# Patient Record
Sex: FEMALE | Race: WHITE | NOT HISPANIC OR LATINO | Employment: FULL TIME | URBAN - METROPOLITAN AREA
[De-identification: names, ages, dates, MRNs, and addresses within clinical notes are randomized per-mention and may not be internally consistent; named-entity substitution may affect disease eponyms.]

---

## 2020-09-15 ENCOUNTER — OFFICE VISIT (OUTPATIENT)
Dept: OBSTETRICS AND GYNECOLOGY | Facility: CLINIC | Age: 25
End: 2020-09-15

## 2020-09-15 VITALS
SYSTOLIC BLOOD PRESSURE: 120 MMHG | BODY MASS INDEX: 28.89 KG/M2 | HEIGHT: 62 IN | WEIGHT: 157 LBS | DIASTOLIC BLOOD PRESSURE: 68 MMHG

## 2020-09-15 DIAGNOSIS — L68.0 FEMALE HIRSUTISM: ICD-10-CM

## 2020-09-15 DIAGNOSIS — N92.6 IRREGULAR MENSES: ICD-10-CM

## 2020-09-15 DIAGNOSIS — Z01.419 WOMEN'S ANNUAL ROUTINE GYNECOLOGICAL EXAMINATION: Primary | ICD-10-CM

## 2020-09-15 PROCEDURE — 99395 PREV VISIT EST AGE 18-39: CPT | Performed by: OBSTETRICS & GYNECOLOGY

## 2020-09-15 PROCEDURE — 99213 OFFICE O/P EST LOW 20 MIN: CPT | Performed by: OBSTETRICS & GYNECOLOGY

## 2020-09-15 RX ORDER — MULTIPLE VITAMINS W/ MINERALS TAB 9MG-400MCG
1 TAB ORAL DAILY
COMMUNITY

## 2020-09-15 NOTE — PROGRESS NOTES
GYN Annual Exam     CC - Here for annual exam.     Subjective   HPI  Mely Esquivel is a 25 y.o. female, , who presents for annual well woman exam.   Patient's last menstrual period was 09/10/2020 (exact date)..  Periods are irregular occurring every 60 days, lasting 4 days, medium flow, with blood clots.    Dysmenorrhea:mild, occurring premenstrually, first 1-2 days of flow and throughout menses.  Patient reports problems with: hair growth on chest, chin, sideburns - concerned about length between cycles and possible hormone imbalance..    Partner Status: Marital Status: .  New Partners since last visit: no.  Desires STD Screening: no.  HPV vaccinated? : yes    Additional OB/GYN History   Current contraception: contraceptive methods: Rhythm method  Desires to: continue contraception  Last Pap : 2018 results were negative, G/C negative  Last Completed Pap Smear       Status Date      PAP SMEAR No completions recorded        History of abnormal Pap smear: yes - 1 about 5-6 years ago but hasn't have any since then  Family history of uterine, colon, breast, or ovarian cancer: yes - father and MGF had colon cancer  Performs monthly Self-Breast Exam: no  Exercises Regularly:no  Feelings of Anxiety or Depression: yes - anxiety - untreated and no medication at this time  Tobacco Usage?: No   OB History        3    Para   1    Term           1    AB   2    Living   1       SAB        TAB   2    Ectopic        Molar        Multiple        Live Births   1                Health Maintenance   Topic Date Due   • Annual Gynecologic Pelvic and Breast Exam  1996   • ANNUAL PHYSICAL  1999   • HPV VACCINES (1 - 2-dose series) 2007   • TDAP/TD VACCINES (1 - Tdap) 2015   • INFLUENZA VACCINE  2020   • CHLAMYDIA SCREENING  2020   • PAP SMEAR  2020   •  AMB Pneumococcal Vaccine 65+ (1 of 1 - PPSV23) 2061   • HEPATITIS C SCREENING  Completed   •  AMB  "Pneumococcal Vaccine 0-64  Aged Out       The additional following portions of the patient's history were reviewed and updated as appropriate: allergies, current medications, past family history, past medical history, past social history, past surgical history and problem list.    Review of Systems   Constitutional: Negative.    HENT: Negative.    Eyes: Negative.    Respiratory: Negative.    Cardiovascular: Positive for palpitations.   Gastrointestinal: Negative.    Endocrine: Negative.    Genitourinary: Positive for menstrual problem.   Musculoskeletal: Negative.    Skin: Negative.    Allergic/Immunologic: Negative.    Neurological: Negative.    Psychiatric/Behavioral: The patient is nervous/anxious.        I have reviewed and agree with the HPI, ROS, and historical information as entered above. Kath Irizarry MD    Objective   /68   Ht 157.5 cm (62\")   Wt 71.2 kg (157 lb)   LMP 09/10/2020 (Exact Date)   Breastfeeding No   BMI 28.72 kg/m²     Physical Exam    General:  well developed; well nourished  no acute distress  Skin:  No suspicious lesions seen  Thyroid: normal to inspection and palpation  Breasts:  Examined in supine position  Symmetric without masses or skin dimpling  Nipples normal without inversion, lesions or discharge  There are no palpable axillary nodes  CVS: RRR, no M/R/G, distal pulses wnl  Resp: CTAB, No W/R/R  Abdomen: soft, non-tender; no masses  no umbilical or inguinal hernias are present  no hepato-splenomegaly  Pelvis: Clinical staff was present for exam  External genitalia:  normal appearance of the external genitalia including Bartholin's and [place]'s glands.  :  urethral meatus normal;  Vaginal:  normal pink mucosa without prolapse or lesions.  Cervix:  normal appearance.  Uterus:  normal size, shape and consistency.  Adnexa:  normal bimanual exam of the adnexa.    Assessment/Plan     Assessment     Problem List Items Addressed This Visit     Women's annual routine " gynecological examination - Primary    Relevant Orders    Pap IG, Rfx HPV ASCU    Female hirsutism    Irregular menses    Relevant Orders    DHEA-Sulfate    Estradiol    Follicle Stimulating Hormone    Luteinizing Hormone    Insulin, Random    CBC (No Diff)    17-Hydroxyprogesterone    TSH    Testosterone Free Direct    Lipid Panel          1. GYN annual well woman exam.   2. Irreg Menses  3. Hirsutism  4. H/o PTD 28wk    Plan     1. Reviewed Pap screening guidelines  2. Pap/ReflexHPV done/declines STD screening  3. Reviewed monthly self breast exams.  Instructed to call with lumps, pain, or breast discharge.    4. Reviewed exercise and healthy diet as a preventative health measures.   5. RTC in 1 year or PRN with problems  6. Other: Will f/u fasting for PCOS panel   7. Discussed 17OHP if she gets pregnant again.      Kath Irizarry MD  09/15/2020

## 2020-09-28 ENCOUNTER — LAB (OUTPATIENT)
Dept: LAB | Facility: HOSPITAL | Age: 25
End: 2020-09-28

## 2020-09-28 ENCOUNTER — TRANSCRIBE ORDERS (OUTPATIENT)
Dept: LAB | Facility: HOSPITAL | Age: 25
End: 2020-09-28

## 2020-09-28 DIAGNOSIS — N92.6 IRREGULAR MENSES: ICD-10-CM

## 2020-09-28 LAB
CHOLEST SERPL-MCNC: 167 MG/DL (ref 0–200)
DEPRECATED RDW RBC AUTO: 38.1 FL (ref 37–54)
ERYTHROCYTE [DISTWIDTH] IN BLOOD BY AUTOMATED COUNT: 12.6 % (ref 12.3–15.4)
ESTRADIOL SERPL HS-MCNC: 324 PG/ML
FSH SERPL-ACNC: 5.59 MIU/ML
HCT VFR BLD AUTO: 44.6 % (ref 34–46.6)
HDLC SERPL-MCNC: 65 MG/DL (ref 40–60)
HGB BLD-MCNC: 15.2 G/DL (ref 12–15.9)
LDLC SERPL CALC-MCNC: 91 MG/DL (ref 0–100)
LDLC/HDLC SERPL: 1.4 {RATIO}
LH SERPL-ACNC: 20.3 MIU/ML
MCH RBC QN AUTO: 28.3 PG (ref 26.6–33)
MCHC RBC AUTO-ENTMCNC: 34.1 G/DL (ref 31.5–35.7)
MCV RBC AUTO: 83.1 FL (ref 79–97)
PLATELET # BLD AUTO: 346 10*3/MM3 (ref 140–450)
PMV BLD AUTO: 10.6 FL (ref 6–12)
RBC # BLD AUTO: 5.37 10*6/MM3 (ref 3.77–5.28)
TRIGL SERPL-MCNC: 54 MG/DL (ref 0–150)
TSH SERPL DL<=0.05 MIU/L-ACNC: 1.86 UIU/ML (ref 0.27–4.2)
VLDLC SERPL-MCNC: 10.8 MG/DL (ref 5–40)
WBC # BLD AUTO: 9.8 10*3/MM3 (ref 3.4–10.8)

## 2020-09-28 PROCEDURE — 83001 ASSAY OF GONADOTROPIN (FSH): CPT

## 2020-09-28 PROCEDURE — 83002 ASSAY OF GONADOTROPIN (LH): CPT

## 2020-09-28 PROCEDURE — 83525 ASSAY OF INSULIN: CPT

## 2020-09-28 PROCEDURE — 84402 ASSAY OF FREE TESTOSTERONE: CPT

## 2020-09-28 PROCEDURE — 84443 ASSAY THYROID STIM HORMONE: CPT

## 2020-09-28 PROCEDURE — 85027 COMPLETE CBC AUTOMATED: CPT

## 2020-09-28 PROCEDURE — 82627 DEHYDROEPIANDROSTERONE: CPT

## 2020-09-28 PROCEDURE — 83498 ASY HYDROXYPROGESTERONE 17-D: CPT

## 2020-09-28 PROCEDURE — 36415 COLL VENOUS BLD VENIPUNCTURE: CPT

## 2020-09-28 PROCEDURE — 82670 ASSAY OF TOTAL ESTRADIOL: CPT

## 2020-09-28 PROCEDURE — 80061 LIPID PANEL: CPT

## 2020-09-30 LAB
DHEA-S SERPL-MCNC: 265 UG/DL (ref 110–431.7)
INSULIN SERPL-ACNC: 11.9 UIU/ML (ref 2.6–24.9)

## 2020-10-03 LAB — TESTOST FREE SERPL-MCNC: 2.8 PG/ML (ref 0–4.2)

## 2020-10-12 ENCOUNTER — LAB (OUTPATIENT)
Dept: LAB | Facility: HOSPITAL | Age: 25
End: 2020-10-12

## 2020-10-12 DIAGNOSIS — N92.6 IRREGULAR MENSTRUAL CYCLE: Primary | ICD-10-CM

## 2020-10-12 PROCEDURE — 36415 COLL VENOUS BLD VENIPUNCTURE: CPT

## 2020-10-12 PROCEDURE — 83498 ASY HYDROXYPROGESTERONE 17-D: CPT

## 2020-10-12 PROCEDURE — 83525 ASSAY OF INSULIN: CPT

## 2020-10-15 LAB — INSULIN SERPL-ACNC: 7.9 UIU/ML

## 2020-10-19 LAB — 17OHP SERPL-MCNC: 27 NG/DL

## 2021-11-02 ENCOUNTER — TELEPHONE (OUTPATIENT)
Dept: OBSTETRICS AND GYNECOLOGY | Facility: CLINIC | Age: 26
End: 2021-11-02

## 2023-05-19 ENCOUNTER — HOSPITAL ENCOUNTER (EMERGENCY)
Facility: HOSPITAL | Age: 28
Discharge: HOME | End: 2023-05-19
Attending: EMERGENCY MEDICINE
Payer: COMMERCIAL

## 2023-05-19 ENCOUNTER — APPOINTMENT (OUTPATIENT)
Dept: GENERAL RADIOLOGY | Facility: HOSPITAL | Age: 28
End: 2023-05-19
Payer: COMMERCIAL

## 2023-05-19 VITALS
TEMPERATURE: 98.8 F | WEIGHT: 165 LBS | HEIGHT: 61 IN | OXYGEN SATURATION: 100 % | BODY MASS INDEX: 31.15 KG/M2 | DIASTOLIC BLOOD PRESSURE: 80 MMHG | RESPIRATION RATE: 16 BRPM | HEART RATE: 85 BPM | SYSTOLIC BLOOD PRESSURE: 128 MMHG

## 2023-05-19 DIAGNOSIS — R20.2 ARM PARESTHESIA, LEFT: Primary | ICD-10-CM

## 2023-05-19 DIAGNOSIS — F41.9 ANXIETY: ICD-10-CM

## 2023-05-19 LAB
ALBUMIN SERPL-MCNC: 4.6 G/DL (ref 3.5–5.2)
ALBUMIN/GLOB SERPL: 1.4 G/DL
ALP SERPL-CCNC: 48 U/L (ref 39–117)
ALT SERPL W P-5'-P-CCNC: 21 U/L (ref 1–33)
ANION GAP SERPL CALCULATED.3IONS-SCNC: 14 MMOL/L (ref 5–15)
AST SERPL-CCNC: 22 U/L (ref 1–32)
BASOPHILS # BLD AUTO: 0.06 10*3/MM3 (ref 0–0.2)
BASOPHILS NFR BLD AUTO: 0.6 % (ref 0–1.5)
BILIRUB SERPL-MCNC: 0.6 MG/DL (ref 0–1.2)
BUN SERPL-MCNC: 8 MG/DL (ref 6–20)
BUN/CREAT SERPL: 10.7 (ref 7–25)
CALCIUM SPEC-SCNC: 9.7 MG/DL (ref 8.6–10.5)
CHLORIDE SERPL-SCNC: 105 MMOL/L (ref 98–107)
CO2 SERPL-SCNC: 22 MMOL/L (ref 22–29)
CREAT SERPL-MCNC: 0.75 MG/DL (ref 0.57–1)
D DIMER PPP FEU-MCNC: 0.28 MCGFEU/ML (ref 0–0.5)
DEPRECATED RDW RBC AUTO: 40.1 FL (ref 37–54)
EGFRCR SERPLBLD CKD-EPI 2021: 112.1 ML/MIN/1.73
EOSINOPHIL # BLD AUTO: 0 10*3/MM3 (ref 0–0.4)
EOSINOPHIL NFR BLD AUTO: 0 % (ref 0.3–6.2)
ERYTHROCYTE [DISTWIDTH] IN BLOOD BY AUTOMATED COUNT: 13 % (ref 12.3–15.4)
GLOBULIN UR ELPH-MCNC: 3.3 GM/DL
GLUCOSE SERPL-MCNC: 101 MG/DL (ref 65–99)
HCT VFR BLD AUTO: 46.9 % (ref 34–46.6)
HGB BLD-MCNC: 15.2 G/DL (ref 12–15.9)
IMM GRANULOCYTES # BLD AUTO: 0.04 10*3/MM3 (ref 0–0.05)
IMM GRANULOCYTES NFR BLD AUTO: 0.4 % (ref 0–0.5)
LYMPHOCYTES # BLD AUTO: 1.57 10*3/MM3 (ref 0.7–3.1)
LYMPHOCYTES NFR BLD AUTO: 16.6 % (ref 19.6–45.3)
MCH RBC QN AUTO: 27.4 PG (ref 26.6–33)
MCHC RBC AUTO-ENTMCNC: 32.4 G/DL (ref 31.5–35.7)
MCV RBC AUTO: 84.5 FL (ref 79–97)
MONOCYTES # BLD AUTO: 0.51 10*3/MM3 (ref 0.1–0.9)
MONOCYTES NFR BLD AUTO: 5.4 % (ref 5–12)
NEUTROPHILS NFR BLD AUTO: 7.29 10*3/MM3 (ref 1.7–7)
NEUTROPHILS NFR BLD AUTO: 77 % (ref 42.7–76)
NRBC BLD AUTO-RTO: 0 /100 WBC (ref 0–0.2)
PLATELET # BLD AUTO: 411 10*3/MM3 (ref 140–450)
PMV BLD AUTO: 9.4 FL (ref 6–12)
POTASSIUM SERPL-SCNC: 3.8 MMOL/L (ref 3.5–5.2)
PROT SERPL-MCNC: 7.9 G/DL (ref 6–8.5)
QT INTERVAL: 346 MS
QTC INTERVAL: 427 MS
RBC # BLD AUTO: 5.55 10*6/MM3 (ref 3.77–5.28)
SODIUM SERPL-SCNC: 141 MMOL/L (ref 136–145)
TROPONIN T SERPL HS-MCNC: <6 NG/L
WBC NRBC COR # BLD: 9.47 10*3/MM3 (ref 3.4–10.8)

## 2023-05-19 PROCEDURE — 93005 ELECTROCARDIOGRAM TRACING: CPT | Performed by: EMERGENCY MEDICINE

## 2023-05-19 PROCEDURE — 71045 X-RAY EXAM CHEST 1 VIEW: CPT

## 2023-05-19 PROCEDURE — 84484 ASSAY OF TROPONIN QUANT: CPT | Performed by: PHYSICIAN ASSISTANT

## 2023-05-19 PROCEDURE — 99283 EMERGENCY DEPT VISIT LOW MDM: CPT

## 2023-05-19 PROCEDURE — 85379 FIBRIN DEGRADATION QUANT: CPT | Performed by: PHYSICIAN ASSISTANT

## 2023-05-19 PROCEDURE — 80053 COMPREHEN METABOLIC PANEL: CPT | Performed by: PHYSICIAN ASSISTANT

## 2023-05-19 PROCEDURE — 85025 COMPLETE CBC W/AUTO DIFF WBC: CPT | Performed by: PHYSICIAN ASSISTANT

## 2023-05-19 RX ORDER — SODIUM CHLORIDE 0.9 % (FLUSH) 0.9 %
10 SYRINGE (ML) INJECTION AS NEEDED
Status: DISCONTINUED | OUTPATIENT
Start: 2023-05-19 | End: 2023-05-19 | Stop reason: HOSPADM

## 2023-05-19 NOTE — Clinical Note
[facility] EMERGENCY DEPARTMENT  [address] [facility] [address]  Phone: [phone]    Mely Esquivel was seen and treated in our emergency department on 5/19/2023.  She may return to work on 05/22/2023.         Thank you for choosing [facility].    Papito Archibald MD

## 2023-05-19 NOTE — DISCHARGE INSTRUCTIONS
Rest.  Tylenol or Motrin as directed for any discomfort.  Call to establish a PCP and arrange for follow-up.  Return if any acute concerns.

## 2023-05-19 NOTE — ED PROVIDER NOTES
"Subjective   History of Present Illness  Jumana Esquivel is a 28-year-old female with history of anxiety but no other known health issues.  She presents to the emergency department with complaints of a pulling sensation\" in the left arm that seems to be worse at night.  She has noticed it off and on for several days.  She states that she had a similar sensation in the right arm a few weeks ago and that it just resolved on its own.  She denies any pain in the arm.  There has been no swelling or redness.  She denies any neck pain.  No chest pain or shortness of breath.  No fever or chills.  No lower extremity pain, tingling or edema.  The patient states that she notices the tingling and then begins to become anxious about it and the tingling gets worse.  She forgets about it during the day and that seems to go away.  She is a non-smoker.  She drinks alcohol on occasion but none recently.  No drug use.        Review of Systems   Constitutional: Negative for chills and fever.   HENT: Negative for sore throat.    Respiratory: Negative for cough and shortness of breath.    Cardiovascular: Negative for chest pain and palpitations.   Gastrointestinal: Negative for abdominal pain, diarrhea, nausea and vomiting.   Genitourinary: Negative for dysuria.   Musculoskeletal: Negative for back pain and neck pain.   Skin: Negative for rash.   Neurological: Negative for weakness and numbness.        \"Tingling\" in left arm   Hematological: Negative.    Psychiatric/Behavioral:        History of anxiety and notes that when she notices the tingling her anxiety gets worse and then the tingling gets worse       Past Medical History:   Diagnosis Date   • Abnormal Pap smear of cervix    •     • Frequent UTI    • Miscarriage     2 or less   • Pregnancy     other than first       No Known Allergies    History reviewed. No pertinent surgical history.    Family History   Problem Relation Age of Onset   • Colon cancer Father    • Lung cancer " "Maternal Grandmother    • Colon cancer Maternal Grandfather        Social History     Socioeconomic History   • Marital status:    Tobacco Use   • Smoking status: Never   Substance and Sexual Activity   • Alcohol use: Yes     Comment: current some day   • Drug use: Never   • Sexual activity: Yes     Partners: Male     Birth control/protection: None           Objective   Physical Exam  Constitutional:       General: She is not in acute distress.     Appearance: Normal appearance. She is not ill-appearing or diaphoretic.   HENT:      Head: Normocephalic.      Nose: Nose normal.      Mouth/Throat:      Mouth: Mucous membranes are moist.   Eyes:      General: No scleral icterus.     Conjunctiva/sclera: Conjunctivae normal.      Pupils: Pupils are equal, round, and reactive to light.   Cardiovascular:      Rate and Rhythm: Normal rate and regular rhythm.      Pulses: Normal pulses.      Heart sounds: Normal heart sounds.      Comments: Normal radial pulses bilaterally.  Normal Rusty's test.  Good cap refill.  No swelling, increased warmth or tenderness.  Pulmonary:      Effort: Pulmonary effort is normal.      Breath sounds: Normal breath sounds.   Abdominal:      Tenderness: There is no abdominal tenderness.   Musculoskeletal:         General: No swelling or tenderness. Normal range of motion.      Cervical back: Normal range of motion and neck supple. No tenderness.      Right lower leg: No edema.      Left lower leg: No edema.   Skin:     General: Skin is warm and dry.      Coloration: Skin is not jaundiced or pale.      Findings: No bruising or rash.   Neurological:      General: No focal deficit present.      Mental Status: She is alert and oriented to person, place, and time.   Psychiatric:      Comments: Somewhat anxious appearing         Procedures           ED Course      In summary, healthy 28-year-old female with a history of anxiety, presents to the emergency department with mild \"tingling\" in the left " arm.  The patient states that this has been intermittent for about 3 days and seems to be more noticeable at night.  She states that she becomes anxious about it and that tends to make the tingling worse.  The tingling either resolves during the day or she does not notice it during her daily activities.  No arm pain or swelling.    MDM: Differential includes anxiety, paresthesia, electrolyte abnormality, radicular neuropathy, hyper or hypoglycemia, etc.    Labs obtained.  Chest x-ray and EKG also ordered.  Patient appears in no distress.    Labs are bland with a normal glucose of 101.  Normal renal and hepatic functions.  Normal chemistries.  White count is normal at 9.47.  No anemia.  D-dimer is negative making likelihood of DVT very low.  High-sensitivity troponin is negative.    Chest x-ray shows no active disease.    EKG shows sinus rhythm with a rate of 92 with nonspecific T abnormalities.  No old EKG for comparison.    11:11 EDT  I spoke with the patient about her work-up.  I feel that anxiety is a major component today.  I reassured her that her labs, EKG, D-dimer, chest x-ray were normal.  She states that she already feels better and will follow-up or return if symptoms persist or worsen.                                         MDM    Final diagnoses:   Arm paresthesia, left   Anxiety       ED Disposition  ED Disposition     ED Disposition   Discharge    Condition   Stable    Comment   --             PATIENT CONNECTION - [place] [address]  [phone]    Call to establish PCP and arrange for follow-up as needed.    [facility] Emergency Department  [address] [address] [phone]    If symptoms worsen         Medication List      No changes were made to your prescriptions during this visit.          Andrés Morgan, PA  05/19/23 1111

## 2023-05-24 ENCOUNTER — OFFICE VISIT (OUTPATIENT)
Dept: FAMILY MEDICINE CLINIC | Facility: CLINIC | Age: 28
End: 2023-05-24
Payer: COMMERCIAL

## 2023-05-24 DIAGNOSIS — R20.2 ARM PARESTHESIA, LEFT: ICD-10-CM

## 2023-05-24 DIAGNOSIS — F41.9 ANXIETY: Primary | ICD-10-CM

## 2023-05-24 PROBLEM — F50.2 BULIMIA: Status: ACTIVE | Noted: 2023-05-24

## 2023-05-24 PROBLEM — F50.20 BULIMIA: Status: ACTIVE | Noted: 2023-05-24

## 2023-05-24 PROCEDURE — 99204 OFFICE O/P NEW MOD 45 MIN: CPT

## 2023-05-24 RX ORDER — HYDROXYZINE HYDROCHLORIDE 25 MG/1
25 TABLET, FILM COATED ORAL 3 TIMES DAILY PRN
Qty: 30 TABLET | Refills: 1 | Status: SHIPPED | OUTPATIENT
Start: 2023-05-24

## 2023-05-24 NOTE — PROGRESS NOTES
"    Office Note     Name: Mely Esquivel    : 1995     MRN: [id number]     Chief Complaint  Establish care, arm pain    History of Present Illness:  Mely Esquivel is a 28 y.o. female who presents today for establishment of care and arm pain.  She denies having a regular PCP who she has seen in the past.  She does report a past medical history of anxiety.  She does not take any daily medicines at present time.    She does report that she believes much of her anxiety is health related.  She reports she often worries about her health frequently and the health of her son.  She reports that it causes her a great deal of stress to be away from home and away from nearby hospitals and medical care. She denies history of panic attacks, but does believe that sometimes her anxiety heightens and she has \"borderline panic attacks.\"  She denies depression symptoms.  She denies SI/HI.  She has never engaged in counseling, however she does believe this is a service that her work offers and is interested in starting counseling.  She does not wish to start medication for anxiety daily.  She does have many concerns regarding the medicines.  Also of note, she reports that she has struggled with bulimia for the past 13 years.  However, she feels like her bulimia is well controlled at present time.  She denies any purging behaviors for nearly a year.    Also of note, she reports she has been experiencing tingling and burning arm pain intermittently for the past 2 weeks.  Reports that it originally began as tingling in her lower right arm and \"feeling like my arm is falling asleep.\"  She reports that the symptoms usually occurred at night.  She reports that only happened once in the right arm and then went away.  She reports that the next week she began feeling similar sensations in her lower left arm and hand.  Reports that when these episodes occur, it worsens her anxiety and she is fearful of what the cause of the symptoms " is. Of note, she do go to ED on 2023 for left arm tingling. All work up, including EKG and labs were within normal range.  She does report that she fell on her right arm 3 weeks ago, but denies any other injury.  She reports that right after the fall she was able to use her arm immediately and does not believe that she injured herself in the fall.  She denies any weakness in either extremity.  She denies that the pain is brought on by musculoskeletal activation, certain range of motion, etc.  Also of note, she does work at a computer for 8 hours/day.  She denies any history of neck trauma, significant neck pain.    Subjective     Review of Systems:   Review of Systems   Constitutional: Negative for chills and fever.   Respiratory: Negative for shortness of breath.    Cardiovascular: Negative for chest pain and palpitations.   Musculoskeletal: Negative for arthralgias, back pain, joint swelling, myalgias, neck pain and neck stiffness.        Arm pain   Skin: Negative for color change.   Neurological: Negative for weakness.   Psychiatric/Behavioral: Negative for depressed mood. The patient is nervous/anxious.        I have reviewed the patients family history, social history, past medical history, past surgical history and have updated it as appropriate.     Past Medical History:   Past Medical History:   Diagnosis Date   • Abnormal Pap smear of cervix    •     • Frequent UTI    • Miscarriage     2 or less   • Pregnancy     other than first       Past Surgical History:   Past Surgical History:   Procedure Laterality Date   • DILATATION AND CURETTAGE     • DILATION AND CURETTAGE, DIAGNOSTIC / THERAPEUTIC         Family History:   Family History   Problem Relation Age of Onset   • Hypertension Mother    • Depression Mother    • Colon cancer Father    • Lung cancer Maternal Grandmother    • Colon cancer Maternal Grandfather        Social History:   Social History     Socioeconomic History   • Marital  "status:    Tobacco Use   • Smoking status: Never   • Smokeless tobacco: Never   Vaping Use   • Vaping Use: Never used   Substance and Sexual Activity   • Alcohol use: Yes     Comment: once per month   • Drug use: Never   • Sexual activity: Yes     Partners: Male     Birth control/protection: None       Immunizations:   Immunization History   Administered Date(s) Administered   • Tdap 03/01/2016        Medications:     Current Outpatient Medications:   •  hydrOXYzine (ATARAX) 25 MG tablet, Take 1 tablet by mouth 3 (Three) Times a Day As Needed for Anxiety., Disp: 30 tablet, Rfl: 1    Allergies:   No Known Allergies    Objective     Vital Signs  Vitals:    05/24/23 1134   BP: 126/74   BP Location: Right arm   Patient Position: Sitting   Cuff Size: Adult   Pulse: 78   Resp: 15   Temp: 97 °F (36.1 °C)   SpO2: 99%   Weight: 74.4 kg (164 lb)   Height: 154.9 cm (61\")   PainSc:   2   PainLoc: Arm     Estimated body mass index is 30.99 kg/m² as calculated from the following:    Height as of this encounter: 154.9 cm (61\").    Weight as of this encounter: 74.4 kg (164 lb).          Physical Exam  Vitals and nursing note reviewed.   Constitutional:       General: She is not in acute distress.     Appearance: Normal appearance. She is not ill-appearing, toxic-appearing or diaphoretic.   HENT:      Head: Normocephalic and atraumatic.   Eyes:      Extraocular Movements: Extraocular movements intact.   Cardiovascular:      Rate and Rhythm: Normal rate and regular rhythm.      Heart sounds: No murmur heard.    No friction rub. No gallop.   Pulmonary:      Effort: Pulmonary effort is normal. No respiratory distress.      Breath sounds: No wheezing, rhonchi or rales.   Musculoskeletal:      Right shoulder: No tenderness. Normal range of motion. Normal strength.      Left shoulder: No tenderness. Normal range of motion. Normal strength.      Right elbow: Normal range of motion. No tenderness.      Left elbow: Normal range of " motion. No tenderness.      Right forearm: No swelling, tenderness or bony tenderness.      Left forearm: No swelling, tenderness or bony tenderness.      Right wrist: No swelling or tenderness. Normal range of motion. Normal pulse.      Left wrist: No swelling or tenderness. Normal range of motion. Normal pulse.      Left hand: Normal capillary refill. Normal pulse.      Cervical back: Normal range of motion.      Comments: Negative Spurling's test to the left.  Negative Tinel and Phalen's test   Skin:     Coloration: Skin is not pale.   Neurological:      Mental Status: She is alert and oriented to person, place, and time. Mental status is at baseline.   Psychiatric:         Mood and Affect: Mood normal.         Thought Content: Thought content normal.          Assessment and Plan     1. Anxiety  -JASON 7 Total Score: 21  -PHQ-9 Total Score: 11  -Denies SI/HI.  -We did discuss the mechanism of action of SSRI medications.  We discussed possible side effects and blackbox warnings associated with SSRIs.  -At this time, she does report that she is nervous to start a daily maintenance medication and does not wish to move forward with prescription for SSRI.  -We also discussed the use of hydroxyzine to have as needed for heightened anxiety.  We discussed the mechanism of action of this medication and possible side effects.  We did discuss that this medication can make her drowsy so she should not take prior to driving, operating machinery, etc.  -If she has any intolerance to the medication, I encouraged her to call or return to care.  -We also discussed that I believe counseling would be very beneficial for this patient.  I offered to give her local counseling information.  She reports she is going to inquire about counseling through her work.  -If symptoms worsen or new symptoms develop prior to follow-up appointment, she has been encouraged to return to care.  -The patient verbalized understanding and had no further  questions.  - hydrOXYzine (ATARAX) 25 MG tablet; Take 1 tablet by mouth 3 (Three) Times a Day As Needed for Anxiety.  Dispense: 30 tablet; Refill: 1    2. Arm paresthesia, left  -Although I am not able to reproduce the patient's symptoms with physical exam maneuvers, I am suspicious that her symptoms are consistent with carpal tunnel.  -We did discuss sleeping in wrist splints at night to help take compression off the median nerve.  -I also encouraged her to take an ibuprofen in the morning and night daily to help relieve inflammation of the nerve.  -We did discuss there are other options such as physical therapy for treatment.  We will start with most conservative approach including over-the-counter medications and splinting before proceeding with referral or imaging.  -If symptoms worsen or new symptoms develop, she has been encouraged to return to care.  -The patient verbalized understanding and had no further questions.         Follow Up  Return in about 1 month (around 6/24/2023) for Annual physical.    Nessa Lawrence PA-C  [facility] [place] [place]

## 2023-05-25 VITALS
OXYGEN SATURATION: 99 % | BODY MASS INDEX: 30.96 KG/M2 | WEIGHT: 164 LBS | RESPIRATION RATE: 15 BRPM | HEART RATE: 78 BPM | SYSTOLIC BLOOD PRESSURE: 126 MMHG | DIASTOLIC BLOOD PRESSURE: 74 MMHG | TEMPERATURE: 97 F | HEIGHT: 61 IN

## 2023-06-06 NOTE — PROGRESS NOTES
"     Female Physical Note      Date: 2023   Patient Name: Mely Esquivel  : 1995   MRN: [id number]     Chief Complaint:    Chief Complaint   Patient presents with    Annual Exam       History of Present Illness: Mely Esquivel is a 28 y.o. female who is here today for their annual health maintenance and physical.     At her last appointment on 2023, she was found to have elevated PHQ-9 and JASON-7 scores.  We did discuss SSRIs at length, however she would not like to start any SSRIs at present time.  She was prescribed hydroxyzine to have as needed for panic attacks.  She has not taken any hydroxyzine since she was last here.  She reports that her anxiety and depression has not worsened since last appointment.  Denies SI/HI.  She is hoping to start counseling with counseling services that her work offers soon.    She has continued to have intermittent numbness/tingling in her lower arms, from the elbow down.  She does report that she is experiencing this less frequently than she was 2 weeks ago.  She reports this occurs mainly at night.  She reports it is occurring more frequently in her right arm than in her left arm.  She reports that \"feels like my arm is going to sleep.\"  She denies ever having the symptoms during the day.  She denies any weakness in the arms.  She denies any discoloration of the hands ever.  She denies any history of trauma or injury.  She has not yet purchased any wrist splints or braces.  She does report sometimes her numbness will extend into her right pinky finger and ring finger.  She has not taken any over-the-counter medication.  She denies any history of neck trauma or neck pain.  She has no pain with neck movement.  Neck movement does not elicit shooting pains down the arms.  There is no swelling in either arm.      Subjective      Review of Systems:   Review of Systems   Constitutional:  Negative for appetite change, chills, fatigue, fever, unexpected weight gain " and unexpected weight loss.   HENT:  Negative for congestion, ear pain, trouble swallowing and voice change.    Eyes:  Negative for blurred vision, double vision and visual disturbance.   Respiratory:  Negative for cough, chest tightness, shortness of breath and wheezing.    Cardiovascular:  Negative for chest pain, palpitations and leg swelling.   Gastrointestinal:  Negative for abdominal pain, blood in stool, constipation, diarrhea, nausea, vomiting and GERD.   Endocrine: Negative for cold intolerance and heat intolerance.   Genitourinary:  Negative for breast discharge, breast lump, breast pain, dysuria, hematuria, pelvic pain, vaginal bleeding, vaginal discharge and vaginal pain.   Musculoskeletal:  Negative for back pain and neck pain.   Skin:  Negative for rash.   Neurological:  Positive for numbness. Negative for dizziness, tremors, seizures, syncope, light-headedness, headache and confusion.   Hematological:  Does not bruise/bleed easily.   Psychiatric/Behavioral:  Negative for sleep disturbance, suicidal ideas and depressed mood. The patient is nervous/anxious.      Past Medical History, Social History, Family History and Care Team were all reviewed with patient and updated as appropriate.     Medications:     Current Outpatient Medications:     hydrOXYzine (ATARAX) 25 MG tablet, Take 1 tablet by mouth 3 (Three) Times a Day As Needed for Anxiety., Disp: 30 tablet, Rfl: 1    Allergies:   No Known Allergies    Immunizations:  Health Maintenance Summary            Postponed - COVID-19 Vaccine (1) Postponed until 6/9/2023 06/07/2023  Postponed until 6/9/2023 by Adriana Lawrence PA-C (Patient Refused)              INFLUENZA VACCINE (Yearly - August to March) Next due on 8/1/2023      No completion, postpone, or frequency change history exists for this topic.              PAP SMEAR (Every 3 Years) Next due on 9/15/2023      09/15/2020  Pap IG, Rfx HPV ASCU              ANNUAL PHYSICAL (Yearly) Next due  on 6/7/2024 06/07/2023  Done              TDAP/TD VACCINES (2 - Td or Tdap) Next due on 3/1/2026      03/01/2016  Imm Admin: Tdap              HEPATITIS C SCREENING  Completed      04/22/2016  Hep C Virus Ab component of Hepatitis panel, acute              Pneumococcal Vaccine 0-64 (Series Information) Aged Out      No completion, postpone, or frequency change history exists for this topic.                     No orders of the defined types were placed in this encounter.       Colorectal Screening:   N/A  Last Completed Colonoscopy       This patient has no relevant Health Maintenance data.          Pap:  Last pap was 09/15/2020, Gynecologist is Samantha Christiansen  Last Completed Pap Smear            PAP SMEAR (Every 3 Years) Next due on 9/15/2023      09/15/2020  Pap IG, Rfx HPV ASCU                   Mammogram:  N/A  Last Completed Mammogram       This patient has no relevant Health Maintenance data.             CT for Smoker (Age 50-80, 20 pk yr):   N/A   Bone Density/DEXA (Age 65 or high risk): N/A  Hep C (Age 18-79 once): Ordered today  HIV (Age 15-65 once): Not ordered today  A1c: Ordered today  Lipid panel: Ordered today    The ASCVD Risk score (Alicia DK, et al., 2019) failed to calculate for the following reasons:    The 2019 ASCVD risk score is only valid for ages 40 to 79    Dermatology: Not within the year, no concerns   Optometrist: Not within the year, no concerns   Dentist: Has appointment in June, no concerns     Tobacco Use: Low Risk     Smoking Tobacco Use: Never    Smokeless Tobacco Use: Never    Passive Exposure: Never       Social History     Substance and Sexual Activity   Alcohol Use Yes    Comment: once per month        Social History     Substance and Sexual Activity   Drug Use Never        Diet/Physical activity: Diet: well-balanced, a good variety of vegetables, drinks mainly water, Physical activity: Tries to walk on lunch break 30 minutes per day  Sleep: At least 8 hours  "nightly    Sexual Health: No current contraception, not attempting pregnancy but fine with possibility, not currently on prenatal but did discuss it  Menopause: N/A  Menstrual Cycles: Menstrual cycles occurring regularly but usually 45 day cycle, last menstrual cycle was May 14th  Domestic Violence: Denies    Depression: PHQ-2 Depression Screening  PHQ-9 Total Score: 11    Measures:   Advanced Care Planning:   Patient does not have an advance directive, information provided.    Smoking Cessation:   N/A    Objective     Physical Exam:  Vital Signs:   Vitals:    06/07/23 1104   BP: 126/72   BP Location: Left arm   Patient Position: Sitting   Cuff Size: Adult   Pulse: 74   Resp: 16   Temp: 97 °F (36.1 °C)   SpO2: 99%   Weight: 75.8 kg (167 lb)   Height: 154.9 cm (61\")     Body mass index is 31.55 kg/m².     Physical Exam  Vitals and nursing note reviewed.   Constitutional:       General: She is not in acute distress.     Appearance: Normal appearance. She is not ill-appearing, toxic-appearing or diaphoretic.   HENT:      Head: Normocephalic and atraumatic.   Eyes:      Extraocular Movements: Extraocular movements intact.   Neck:      Comments: No thyromegaly or masses appreciated.  Cervical extension and rotation does not reproduce pain or tingling in arms  Cardiovascular:      Rate and Rhythm: Normal rate and regular rhythm.      Heart sounds: No murmur heard.    No friction rub. No gallop.   Pulmonary:      Effort: Pulmonary effort is normal. No respiratory distress.      Breath sounds: No wheezing, rhonchi or rales.   Musculoskeletal:      Right elbow: No swelling. Normal range of motion.      Left elbow: No swelling. Normal range of motion.      Right forearm: No swelling.      Left forearm: No swelling.      Right wrist: No swelling, tenderness or bony tenderness. Normal range of motion. Normal pulse.      Left wrist: No swelling, tenderness or bony tenderness. Normal range of motion. Normal pulse.      Right " hand: No swelling, tenderness or bony tenderness. Normal range of motion. Normal strength. Normal sensation. Normal capillary refill.      Left hand: No swelling, tenderness or bony tenderness. Normal range of motion. Normal strength. Normal sensation. Normal capillary refill.      Cervical back: Normal range of motion. No rigidity.      Comments: Tapping on cubital tunnel does not precipitate symptoms on right arm   Lymphadenopathy:      Cervical: No cervical adenopathy.   Skin:     Coloration: Skin is not pale.   Neurological:      Mental Status: She is alert and oriented to person, place, and time. Mental status is at baseline.   Psychiatric:         Mood and Affect: Mood normal.         Thought Content: Thought content normal.       Assessment / Plan      Assessment/Plan:   Diagnoses and all orders for this visit:    1. Well adult exam (Primary)  -Yearly health maintenance labs ordered today.  -We did discuss preventative health topics such as importance of yearly optometry appointments, yearly skin checks, and twice yearly dental appointments and adhering to a well-balanced diet, regular physical activity, regular sunscreen use, seatbelt use, monthly self breast exams, mental health, etc.  -Did discuss importance of prenatal vitamin, as she is currently sexually active and is not on any form of birth control.  She does not wish to discuss any type of contraception at present time.  -She is up-to-date on Pap smear.  -     CBC (No Diff); Future  -     Comprehensive Metabolic Panel; Future  -     Hemoglobin A1c; Future  -     Lipid Panel; Future  -     Vitamin B12; Future  -     TSH Rfx On Abnormal To Free T4; Future  -     Urinalysis With Culture If Indicated -; Future  -     Iron Profile; Future  -     Ferritin; Future  -     CBC (No Diff)  -     Comprehensive Metabolic Panel  -     Hemoglobin A1c  -     Lipid Panel  -     Vitamin B12  -     TSH Rfx On Abnormal To Free T4  -     Urinalysis With Culture If  Indicated - Urine, Clean Catch  -     Iron Profile  -     Ferritin    2. Anxiety  -PHQ-9 Total Score: 11  -PHQ-9 and JASON-7 scores are elevated.  -We had a lengthy discussion at last appointment about pathophysiology and mechanism of action of SSRIs to treat depression and anxiety.  We revisited this topic today, and she would not like to move forward with any pharmacologic intervention at present time.  -I have encouraged enrolling in counseling, which is a service her work offers.  -She has hydroxyzine to take as needed for panic attacks.  -If symptoms of anxiety or depression worsen or new symptoms develop, she has been encouraged to return to care.    3. Paresthesia of arm  -It appears that her paresthesias are likely due to either carpal tunnel or cubital tunnel syndrome.  -I have discussed supportive care options for now, ways to relieve compression of the nerve, taking ibuprofen to help with inflammation, sleeping in carpal tunnel splints or using elbow soft brace.  -We did discuss that if symptoms persist or worsen, other options could include physical therapy and nerve conduction/EMG studies.  -Patient reports that her symptoms do seem to be improving.  If symptoms worsen or new symptoms develop, she reports she will return to care sooner than follow-up appointment.    4. Obesity (BMI 30.0-34.9)  -We discussed that the foundation of weight loss should be diet and exercise.  We discussed the importance of a well-balanced diet.  We discussed sustainable dietary changes, such as decreasing portion size, cutting out high calorie drinks, abstaining from eating late at night when metabolism is at its slowest, and trying to reduce fatty and fried food intake.  I also recommended daily exercise, ideally 150 minutes of aerobic activity weekly.  -We discussed that obesity can predispose her to chronic conditions such as hypertension, GERD, type 2 diabetes, sleep apnea etc.       Healthcare Maintenance:  Counseling  provided based on age appropriate USPSTF guidelines.  BMI is >= 30 and <35. (Class 1 Obesity). The following options were offered after discussion;: exercise counseling/recommendations and nutrition counseling/recommendations    Mely Esquivel voices understanding and acceptance of this advice and will call back with any further questions or concerns. AVS with preventive healthcare tips printed for patient.     Follow Up:   Return in about 6 months (around 12/7/2023) for Recheck.      At [facility], we believe that sharing information builds trust and better relationships. You are receiving this note because you recently visited [facility]. It is possible you will see health information before a provider has talked with you about it. This kind of information can be easy to misunderstand. To help you fully understand what it means for your health, we urge you to discuss this note with your provider.    Nessa Lawrence PA-C  [facility]

## 2023-06-07 ENCOUNTER — OFFICE VISIT (OUTPATIENT)
Dept: FAMILY MEDICINE CLINIC | Facility: CLINIC | Age: 28
End: 2023-06-07
Payer: COMMERCIAL

## 2023-06-07 VITALS
HEART RATE: 74 BPM | SYSTOLIC BLOOD PRESSURE: 126 MMHG | OXYGEN SATURATION: 99 % | TEMPERATURE: 97 F | BODY MASS INDEX: 31.53 KG/M2 | WEIGHT: 167 LBS | DIASTOLIC BLOOD PRESSURE: 72 MMHG | HEIGHT: 61 IN | RESPIRATION RATE: 16 BRPM

## 2023-06-07 DIAGNOSIS — Z00.00 WELL ADULT EXAM: Primary | ICD-10-CM

## 2023-06-07 DIAGNOSIS — E66.9 OBESITY (BMI 30.0-34.9): ICD-10-CM

## 2023-06-07 DIAGNOSIS — F41.9 ANXIETY: ICD-10-CM

## 2023-06-07 DIAGNOSIS — R20.2 PARESTHESIA OF ARM: ICD-10-CM

## 2023-06-07 LAB
BILIRUB UR QL STRIP: NEGATIVE
CLARITY UR: CLEAR
COLOR UR: YELLOW
DEPRECATED RDW RBC AUTO: 38.9 FL (ref 37–54)
ERYTHROCYTE [DISTWIDTH] IN BLOOD BY AUTOMATED COUNT: 12.8 % (ref 12.3–15.4)
GLUCOSE UR STRIP-MCNC: NEGATIVE MG/DL
HCT VFR BLD AUTO: 44.8 % (ref 34–46.6)
HGB BLD-MCNC: 14.9 G/DL (ref 12–15.9)
HGB UR QL STRIP.AUTO: NEGATIVE
KETONES UR QL STRIP: NEGATIVE
LEUKOCYTE ESTERASE UR QL STRIP.AUTO: NEGATIVE
MCH RBC QN AUTO: 27.7 PG (ref 26.6–33)
MCHC RBC AUTO-ENTMCNC: 33.3 G/DL (ref 31.5–35.7)
MCV RBC AUTO: 83.4 FL (ref 79–97)
NITRITE UR QL STRIP: NEGATIVE
PH UR STRIP.AUTO: 6 [PH] (ref 5–8)
PLATELET # BLD AUTO: 351 10*3/MM3 (ref 140–450)
PMV BLD AUTO: 10.3 FL (ref 6–12)
PROT UR QL STRIP: NEGATIVE
RBC # BLD AUTO: 5.37 10*6/MM3 (ref 3.77–5.28)
SP GR UR STRIP: 1.01 (ref 1–1.03)
UROBILINOGEN UR QL STRIP: NORMAL
WBC NRBC COR # BLD: 8.28 10*3/MM3 (ref 3.4–10.8)

## 2023-06-07 PROCEDURE — 80061 LIPID PANEL: CPT

## 2023-06-07 PROCEDURE — 83036 HEMOGLOBIN GLYCOSYLATED A1C: CPT

## 2023-06-07 PROCEDURE — 80050 GENERAL HEALTH PANEL: CPT

## 2023-06-07 PROCEDURE — 82607 VITAMIN B-12: CPT

## 2023-06-07 PROCEDURE — 83540 ASSAY OF IRON: CPT

## 2023-06-07 PROCEDURE — 81003 URINALYSIS AUTO W/O SCOPE: CPT

## 2023-06-07 PROCEDURE — 82728 ASSAY OF FERRITIN: CPT

## 2023-06-07 PROCEDURE — 84466 ASSAY OF TRANSFERRIN: CPT

## 2023-06-08 LAB
ALBUMIN SERPL-MCNC: 5 G/DL (ref 3.5–5.2)
ALBUMIN/GLOB SERPL: 1.9 G/DL
ALP SERPL-CCNC: 41 U/L (ref 39–117)
ALT SERPL W P-5'-P-CCNC: 32 U/L (ref 1–33)
ANION GAP SERPL CALCULATED.3IONS-SCNC: 12 MMOL/L (ref 5–15)
AST SERPL-CCNC: 25 U/L (ref 1–32)
BILIRUB SERPL-MCNC: 0.3 MG/DL (ref 0–1.2)
BUN SERPL-MCNC: 7 MG/DL (ref 6–20)
BUN/CREAT SERPL: 9.5 (ref 7–25)
CALCIUM SPEC-SCNC: 9.4 MG/DL (ref 8.6–10.5)
CHLORIDE SERPL-SCNC: 102 MMOL/L (ref 98–107)
CHOLEST SERPL-MCNC: 153 MG/DL (ref 0–200)
CO2 SERPL-SCNC: 26 MMOL/L (ref 22–29)
CREAT SERPL-MCNC: 0.74 MG/DL (ref 0.57–1)
EGFRCR SERPLBLD CKD-EPI 2021: 113.9 ML/MIN/1.73
FERRITIN SERPL-MCNC: 110 NG/ML (ref 13–150)
GLOBULIN UR ELPH-MCNC: 2.6 GM/DL
GLUCOSE SERPL-MCNC: 81 MG/DL (ref 65–99)
HBA1C MFR BLD: 5.4 % (ref 4.8–5.6)
HDLC SERPL-MCNC: 52 MG/DL (ref 40–60)
IRON 24H UR-MRATE: 78 MCG/DL (ref 37–145)
IRON SATN MFR SERPL: 18 % (ref 20–50)
LDLC SERPL CALC-MCNC: 91 MG/DL (ref 0–100)
LDLC/HDLC SERPL: 1.75 {RATIO}
POTASSIUM SERPL-SCNC: 4 MMOL/L (ref 3.5–5.2)
PROT SERPL-MCNC: 7.6 G/DL (ref 6–8.5)
SODIUM SERPL-SCNC: 140 MMOL/L (ref 136–145)
TIBC SERPL-MCNC: 446 MCG/DL (ref 298–536)
TRANSFERRIN SERPL-MCNC: 299 MG/DL (ref 200–360)
TRIGL SERPL-MCNC: 49 MG/DL (ref 0–150)
TSH SERPL DL<=0.05 MIU/L-ACNC: 1.05 UIU/ML (ref 0.27–4.2)
VIT B12 BLD-MCNC: 511 PG/ML (ref 211–946)
VLDLC SERPL-MCNC: 10 MG/DL (ref 5–40)

## 2023-11-22 ENCOUNTER — TELEPHONE (OUTPATIENT)
Dept: FAMILY MEDICINE CLINIC | Facility: CLINIC | Age: 28
End: 2023-11-22
Payer: COMMERCIAL

## 2023-11-22 DIAGNOSIS — R30.0 DYSURIA: Primary | ICD-10-CM

## 2023-11-22 NOTE — TELEPHONE ENCOUNTER
Caller: Mely Esquivel    Relationship: Self    Best call back number: [phone]     What medication are you requesting: SOMETHING TO TREAT A UTI    What are your current symptoms: URGENCY TO URINATE     How long have you been experiencing symptoms: THIS MORNING     Have you had these symptoms before:    [x] Yes  [] No    Have you been treated for these symptoms before:   [x] Yes  [] No    If a prescription is needed, what is your preferred pharmacy and phone number:  [id number] - [place], [place] - [address] [place] [address] - [phone]  - [phone]     Additional notes:

## 2023-11-27 RX ORDER — NITROFURANTOIN 25; 75 MG/1; MG/1
100 CAPSULE ORAL 2 TIMES DAILY
Qty: 20 CAPSULE | Refills: 0 | Status: SHIPPED | OUTPATIENT
Start: 2023-11-27

## 2024-04-16 ENCOUNTER — OFFICE VISIT (OUTPATIENT)
Dept: OBSTETRICS AND GYNECOLOGY | Facility: CLINIC | Age: 29
End: 2024-04-16
Payer: COMMERCIAL

## 2024-04-16 VITALS — BODY MASS INDEX: 32.69 KG/M2 | DIASTOLIC BLOOD PRESSURE: 80 MMHG | SYSTOLIC BLOOD PRESSURE: 114 MMHG | WEIGHT: 173 LBS

## 2024-04-16 DIAGNOSIS — Z30.09 FAMILY PLANNING: ICD-10-CM

## 2024-04-16 DIAGNOSIS — N92.6 IRREGULAR BLEEDING: ICD-10-CM

## 2024-04-16 DIAGNOSIS — Z01.419 WOMEN'S ANNUAL ROUTINE GYNECOLOGICAL EXAMINATION: Primary | ICD-10-CM

## 2024-04-16 DIAGNOSIS — Z01.419 PAP TEST, AS PART OF ROUTINE GYNECOLOGICAL EXAMINATION: ICD-10-CM

## 2024-04-16 DIAGNOSIS — E66.9 OBESITY (BMI 30-39.9): ICD-10-CM

## 2024-04-16 PROCEDURE — 99385 PREV VISIT NEW AGE 18-39: CPT | Performed by: NURSE PRACTITIONER

## 2024-04-16 NOTE — PROGRESS NOTES
Chief Complaint   Patient presents with    Gynecologic Exam       Subjective   HPI  Mely Esquivel is a 29 y.o. female, , who presents for long cycles and irregular periods. Patient reports about 5 years ago she had labs drawn for PCOS but can't remember the results. Patient would like to repeat labs.   Patient has hx of placenta abruption with her last child (2016) and delivered at 28 weeks. Patient is wondering if this will affect future pregnancies and what her chances are of having it happen again in a future pregnancy.     She has not been preventing pregnancy.  Same partner as her 8 yr old son.    Patient's last menstrual period was 2024..  Her periods occur every 32-54 days (very irregular) averaging about 42-44 days, lasting 4 days.  She reports dysmenorrhea is mild, occurring premenstrually  Partner Status: Marital Status: .  New Partners since last visit: no.  Desires STD Screening: yes.    Additional OB/GYN History   Current contraception: contraceptive methods: None  Desires to: do not start contraception  Last Pap : patient thinks it was abnormal    History of abnormal Pap smear: yes - back in , reports normal after that  Last mammogram:  never    Tobacco Usage?: No   OB History          3    Para   1    Term           1    AB   2    Living   1         SAB        IAB   2    Ectopic        Molar        Multiple        Live Births   1                Health Maintenance   Topic Date Due    Annual Gynecologic Pelvic and Breast Exam  2021    COVID-19 Vaccine ( - -24 season) Never done    PAP SMEAR  09/15/2023    ANNUAL PHYSICAL  2024    BMI FOLLOWUP  2024    INFLUENZA VACCINE  2024    TDAP/TD VACCINES (2 - Td or Tdap) 2026    HEPATITIS C SCREENING  Completed    Pneumococcal Vaccine 0-64  Aged Out       The additional following portions of the patient's history were reviewed and updated as appropriate: allergies,  current medications, past family history, past medical history, past social history, past surgical history, and problem list.    Review of Systems   All other systems reviewed and are negative.      I have reviewed and agree with the HPI, ROS, and historical information as entered above. Tiffani Gaxiola, APRN      Objective   /80   Wt 78.5 kg (173 lb)   LMP 03/03/2024   BMI 32.69 kg/m²     Physical Exam  Vitals and nursing note reviewed. Exam conducted with a chaperone present.   Constitutional:       General: She is not in acute distress.     Appearance: Normal appearance. She is well-developed. She is obese. She is not ill-appearing.   Neck:      Thyroid: No thyroid mass or thyromegaly.   Pulmonary:      Effort: Pulmonary effort is normal. No respiratory distress or retractions.   Chest:      Chest wall: No mass.   Breasts:     Right: Normal. No mass, nipple discharge, skin change or tenderness.      Left: Normal. No mass, nipple discharge, skin change or tenderness.   Abdominal:      General: There is no distension.      Palpations: Abdomen is soft. Abdomen is not rigid. There is no mass.      Tenderness: There is no abdominal tenderness. There is no guarding or rebound.      Hernia: No hernia is present. There is no hernia in the left inguinal area.   Genitourinary:     General: Normal vulva.      Labia:         Right: No rash, tenderness or lesion.         Left: No rash, tenderness or lesion.       Vagina: Normal. No vaginal discharge or lesions.      Cervix: Normal.      Uterus: Normal. Not enlarged, not fixed and not tender.       Adnexa: Right adnexa normal and left adnexa normal.        Right: No mass or tenderness.          Left: No mass or tenderness.        Rectum: No external hemorrhoid.   Musculoskeletal:      Cervical back: No muscular tenderness.   Skin:     General: Skin is warm and dry.   Neurological:      Mental Status: She is alert and oriented to person, place, and time.   Psychiatric:          Mood and Affect: Mood normal.         Behavior: Behavior normal.         Assessment & Plan     Assessment     Problem List Items Addressed This Visit          Genitourinary and Reproductive     Women's annual routine gynecological examination - Primary    Relevant Orders    LIQUID-BASED PAP SMEAR WITH HPV GENOTYPING REGARDLESS OF INTERPRETATION ([facility])     Other Visit Diagnoses       Obesity (BMI 30-39.9)        Irregular bleeding        Relevant Orders    DHEA-Sulfate    Comprehensive Metabolic Panel    CBC & Differential    TSH    Testosterone    T4, Free    Prolactin    HCG, B-subunit, Quantitative    Hemoglobin A1c    Vitamin D,25-Hydroxy    Pap test, as part of routine gynecological examination        Family planning                Plan     Return in about 1 year (around 4/16/2025), or if symptoms worsen or fail to improve, for Annual physical.  D/w pt continue daiily PNV and cycle calendar.  Call if no period for 3 months.  Enc LH kits.  Enc healthy diet and exercise.  Weight management.  Monthly BSE; call prn lumps, pain, nipple discharge, skin changes.  Will notify of labs.  RTO one year and prn.      Tiffani Gaxiola, APRN  04/16/2024

## 2024-04-22 LAB — REF LAB TEST METHOD: NORMAL

## 2025-02-10 ENCOUNTER — INITIAL PRENATAL (OUTPATIENT)
Dept: OBSTETRICS AND GYNECOLOGY | Facility: CLINIC | Age: 30
End: 2025-02-10
Payer: COMMERCIAL

## 2025-02-10 VITALS — WEIGHT: 170 LBS | SYSTOLIC BLOOD PRESSURE: 110 MMHG | DIASTOLIC BLOOD PRESSURE: 70 MMHG | BODY MASS INDEX: 32.12 KG/M2

## 2025-02-10 DIAGNOSIS — Z34.81 MULTIGRAVIDA IN FIRST TRIMESTER: Primary | ICD-10-CM

## 2025-02-10 DIAGNOSIS — Z87.51 HISTORY OF PRETERM DELIVERY: ICD-10-CM

## 2025-02-10 PROBLEM — Z01.419 WOMEN'S ANNUAL ROUTINE GYNECOLOGICAL EXAMINATION: Status: RESOLVED | Noted: 2020-09-15 | Resolved: 2025-02-10

## 2025-02-10 LAB
BILIRUB BLD-MCNC: NEGATIVE MG/DL
CLARITY, POC: CLEAR
COLOR UR: YELLOW
GLUCOSE UR STRIP-MCNC: NEGATIVE MG/DL
KETONES UR QL: NEGATIVE
LEUKOCYTE EST, POC: NEGATIVE
NITRITE UR-MCNC: NEGATIVE MG/ML
PH UR: 6.5 [PH] (ref 5–8)
PROT UR STRIP-MCNC: NEGATIVE MG/DL
RBC # UR STRIP: NEGATIVE /UL
SP GR UR: 1.01 (ref 1–1.03)
UROBILINOGEN UR QL: NORMAL

## 2025-02-10 RX ORDER — PROGESTERONE 200 MG/1
200 CAPSULE ORAL DAILY
Qty: 30 CAPSULE | Refills: 1 | Status: SHIPPED | OUTPATIENT
Start: 2025-02-10

## 2025-02-10 NOTE — PROGRESS NOTES
Initial ob visit     CC- Here for care of pregnancy        Mely Esquivel is a 29 y.o. female, , who presents for her first obstetrical visit.  Patient's last menstrual period was 2024.. Her SREE is 2025, by Last Menstrual Period. Current GA is 9w6d.     Initial positive test date : 25, UPT        Her periods are every 30-47 days.  Prior obstetric issues: placental abruption  Patient's past medical history is significant for:  28 wks  placental abruption .  Family history of genetic issues (includes FOB): pt has cousin that's autistic  Prior infections concerning in pregnancy (Rash, fever in last 2 weeks): No  Varicella Hx - vaccinated  Prior testing for Cystic Fibrosis Carrier or Sickle Cell Trait- unknown  Prepregnancy BMI - Body mass index is 32.12 kg/m².  History of STD: no  Hx of HSV for patient or partner: no  Ultrasound Today: yes    OB History    Para Term  AB Living   4 1   1 2 1   SAB IAB Ectopic Molar Multiple Live Births     2       1      # Outcome Date GA Lbr Aime/2nd Weight Sex Type Anes PTL Lv   4 Current            3  16 28w0d  1077 g (2 lb 6 oz) M Vaginal unsp  Y MERI      Complications: Placental abruption   2 IAB            1 IAB                Additional Pertinent History   Last Pap : 24 Result: negative HPV: negative     Last Completed Pap Smear            PAP SMEAR (Every 3 Years) Next due on 2024  LIQUID-BASED PAP SMEAR WITH HPV GENOTYPING REGARDLESS OF INTERPRETATION (DORIE,COR,MAD)    09/15/2020  Pap IG, Rfx HPV ASCU                  History of abnormal Pap smear: yes - unknown  Family history of uterine, colon, breast, or ovarian cancer: yes - dad and maternal grandfather had colon cancer  Feelings of Anxiety or Depression: yes - anxiety  Tobacco Usage?: No   Alcohol/Drug Use?: NO  Over the age of 35 at delivery: no  Genetic Screening: desires cell free DNA  Flu Status: Declines    PMH    Current  Outpatient Medications:     PRENATAL VIT-FE-FA-CA-DSS-DHA PO, Take  by mouth., Disp: , Rfl:     Progesterone (Prometrium) 200 MG capsule, Take 1 capsule by mouth Daily., Disp: 30 capsule, Rfl: 1     Past Medical History:   Diagnosis Date    Abnormal Pap smear of cervix          Frequent UTI     Miscarriage     2 or less    Ovarian cyst     Small    Pregnancy     other than first        Past Surgical History:   Procedure Laterality Date    DILATATION AND CURETTAGE      DILATION AND CURETTAGE, DIAGNOSTIC / THERAPEUTIC         Review of Systems   Review of Systems   Constitutional: Negative.    HENT: Negative.     Eyes: Negative.    Respiratory: Negative.     Cardiovascular: Negative.    Gastrointestinal: Negative.    Endocrine: Negative.    Genitourinary: Negative.    Musculoskeletal: Negative.    Skin: Negative.    Allergic/Immunologic: Negative.    Neurological: Negative.    Hematological: Negative.    Psychiatric/Behavioral: Negative.         Patient Reports:  had brown spotting around her 4-5th  week of pregnancy. She also had a friend, who's an [employer]/s tech, scan her about a week ago, and said she had a subchorionic bleed. Today she denies any bleeding and is c/o intermittent yellow vaginal discharge w/o itching or  irritation, nausea and vomiting.  Patient Denies:excessive nausea , excessive vomiting, and vaginal bleeding  All systems reviewed and otherwise normal.    I have reviewed and agree with the HPI, ROS, and historical information as entered above. Kath Irizarry MD      /70   Wt 77.1 kg (170 lb)   LMP 2024   BMI 32.12 kg/m²     The additional following portions of the patient's history were reviewed and updated as appropriate: allergies, current medications, past family history, past medical history, past social history, past surgical history, and problem list.    Physical Exam  General:  well developed; well nourished  no acute distress  mentation appropriate    Chest/Respiratory: No labored breathing, normal respiratory effort, normal appearance, no respiratory noises noted   Heart:  not examined   Thyroid: normal to inspection and palpation   Breasts:  Not performed.   Abdomen: soft, non-tender; no masses  no umbilical or inguinal hernias are present  no hepato-splenomegaly   Pelvis: Not performed.        Assessment and Plan    Problem List Items Addressed This Visit       Multigravida in first trimester - Primary    Relevant Orders    POC Urinalysis Dipstick (Completed)    Obstetric Panel (Completed)    HIV-1 / O / 2 Ag / Antibody (Completed)    Urine Culture - Urine, Urine, Clean Catch (Completed)    Urinalysis With Microscopic - Urine, Clean Catch (Completed)    Chlamydia trachomatis, Neisseria gonorrhoeae, PCR - Urine, Urine, Clean Catch (Completed)    Urine Drug Screen - Urine, Clean Catch (Completed)    [id number] PLUS Core+SCA+ESS - Blood,    Anticardiolipin Antibody, IgG / M, Qn    Lupus Anticoagulant Panel    Antithrombin III    History of  delivery    Overview     Plan cervical ultrasound 16wk            Pregnancy at 9w6d  Reviewed routine prenatal care with the office and educational materials given  Lab(s) Ordered  Discussed options for genetic testing including first trimester nuchal translucency screen, genetic disease carrier testing, quadruple screen, and NIPT  Medication(s) Ordered  Discontinue the use of all non-medicinal drugs and chemicals  Nausea/Vomiting - she does not desire medications at this time.  Discussed conservative ways to help with nausea.  Patient is on Prenatal vitamins  Activity recommendation : 150 minutes/week of moderate intensity aerobic activity unless we limit for bleeding, hypertension or other pregnancy complication   discussed baby aspirin from 12 weeks to delivery for prevention of preeclampsia   Will start on vaginal progesterone for h/o PTD, plan 16wk ultrasound  Return in about 4 weeks (around 3/10/2025) for Next  scheduled follow up.      Kath Irizarry MD  02/10/2025

## 2025-02-11 LAB
ABO GROUP BLD: NORMAL
APPEARANCE UR: CLEAR
BACTERIA #/AREA URNS HPF: NORMAL /[HPF]
BASOPHILS # BLD AUTO: 0.1 X10E3/UL (ref 0–0.2)
BASOPHILS NFR BLD AUTO: 1 %
BILIRUB UR QL STRIP: NEGATIVE
BLD GP AB SCN SERPL QL: NEGATIVE
CARDIOLIPIN IGG SER IA-ACNC: <9 GPL U/ML (ref 0–14)
CARDIOLIPIN IGM SER IA-ACNC: 10 MPL U/ML (ref 0–12)
CASTS URNS QL MICRO: NORMAL /LPF
COLOR UR: YELLOW
EOSINOPHIL # BLD AUTO: 0.1 X10E3/UL (ref 0–0.4)
EOSINOPHIL NFR BLD AUTO: 1 %
EPI CELLS #/AREA URNS HPF: NORMAL /HPF (ref 0–10)
ERYTHROCYTE [DISTWIDTH] IN BLOOD BY AUTOMATED COUNT: 13 % (ref 11.7–15.4)
GLUCOSE UR QL STRIP: NEGATIVE
HBV SURFACE AG SERPL QL IA: NEGATIVE
HCT VFR BLD AUTO: 44.8 % (ref 34–46.6)
HCV IGG SERPL QL IA: NON REACTIVE
HGB BLD-MCNC: 14.5 G/DL (ref 11.1–15.9)
HGB UR QL STRIP: NEGATIVE
HIV 1+2 AB+HIV1 P24 AG SERPL QL IA: NON REACTIVE
IMM GRANULOCYTES # BLD AUTO: 0 X10E3/UL (ref 0–0.1)
IMM GRANULOCYTES NFR BLD AUTO: 0 %
KETONES UR QL STRIP: NEGATIVE
LEUKOCYTE ESTERASE UR QL STRIP: NEGATIVE
LYMPHOCYTES # BLD AUTO: 2.1 X10E3/UL (ref 0.7–3.1)
LYMPHOCYTES NFR BLD AUTO: 22 %
MCH RBC QN AUTO: 27.9 PG (ref 26.6–33)
MCHC RBC AUTO-ENTMCNC: 32.4 G/DL (ref 31.5–35.7)
MCV RBC AUTO: 86 FL (ref 79–97)
MICRO URNS: NORMAL
MICRO URNS: NORMAL
MONOCYTES # BLD AUTO: 0.6 X10E3/UL (ref 0.1–0.9)
MONOCYTES NFR BLD AUTO: 6 %
NEUTROPHILS # BLD AUTO: 6.6 X10E3/UL (ref 1.4–7)
NEUTROPHILS NFR BLD AUTO: 70 %
NITRITE UR QL STRIP: NEGATIVE
PH UR STRIP: 7 [PH] (ref 5–7.5)
PLATELET # BLD AUTO: 406 X10E3/UL (ref 150–450)
PROT UR QL STRIP: NEGATIVE
RBC # BLD AUTO: 5.19 X10E6/UL (ref 3.77–5.28)
RBC #/AREA URNS HPF: NORMAL /HPF (ref 0–2)
RH BLD: NEGATIVE
RPR SER QL: NON REACTIVE
RUBV IGG SERPL IA-ACNC: 1.05 INDEX
SP GR UR STRIP: 1.02 (ref 1–1.03)
UROBILINOGEN UR STRIP-MCNC: 0.2 MG/DL (ref 0.2–1)
WBC # BLD AUTO: 9.4 X10E3/UL (ref 3.4–10.8)
WBC #/AREA URNS HPF: NORMAL /HPF (ref 0–5)

## 2025-02-12 LAB
AMPHETAMINES UR QL SCN: NEGATIVE NG/ML
AT III ACT/NOR PPP CHRO: 124 % (ref 75–135)
BACTERIA UR CULT: NO GROWTH
BACTERIA UR CULT: NORMAL
BARBITURATES UR QL SCN: NEGATIVE NG/ML
BENZODIAZ UR QL SCN: NEGATIVE NG/ML
BZE UR QL SCN: NEGATIVE NG/ML
C TRACH RRNA SPEC QL NAA+PROBE: NEGATIVE
CANNABINOIDS UR QL SCN: NEGATIVE NG/ML
CREAT UR-MCNC: 135.5 MG/DL (ref 20–300)
LABORATORY COMMENT REPORT: NORMAL
METHADONE UR QL SCN: NEGATIVE NG/ML
N GONORRHOEA RRNA SPEC QL NAA+PROBE: NEGATIVE
OPIATES UR QL SCN: NEGATIVE NG/ML
OXYCODONE+OXYMORPHONE UR QL SCN: NEGATIVE NG/ML
PCP UR QL: NEGATIVE NG/ML
PH UR: 6.9 [PH] (ref 4.5–8.9)
PROPOXYPH UR QL SCN: NEGATIVE NG/ML

## 2025-02-14 LAB
5P15 DELETION (CRI-DU-CHAT): NOT DETECTED
CFDNA.FET/CFDNA.TOTAL SFR FETUS: NORMAL %
CITATION REF LAB TEST: NORMAL
FET 13+18+21+X+Y ANEUP PLAS.CFDNA: NEGATIVE
FET 1P36 DEL RISK WBC.DNA+CFDNA QL: NOT DETECTED
FET 22Q11.2 DEL RISK WBC.DNA+CFDNA QL: NOT DETECTED
FET CHR 11Q23 DEL PLAS.CFDNA QL: NOT DETECTED
FET CHR 15Q11 DEL PLAS.CFDNA QL: NOT DETECTED
FET CHR 21 TS PLAS.CFDNA QL: NEGATIVE
FET CHR 4P16 DEL PLAS.CFDNA QL: NOT DETECTED
FET CHR 8Q24 DEL PLAS.CFDNA QL: NOT DETECTED
FET MS X RISK WBC.DNA+CFDNA QL: NOT DETECTED
FET SEX PLAS.CFDNA DOSAGE CFDNA: NORMAL
FET TS 13 RISK PLAS.CFDNA QL: NEGATIVE
FET TS 18 RISK WBC.DNA+CFDNA QL: NEGATIVE
FET X + Y ANEUP RISK PLAS.CFDNA SEQ-IMP: NOT DETECTED
GA EST FROM CONCEPTION DATE: NORMAL D
GESTATIONAL AGE > 9:: YES
LAB DIRECTOR NAME PROVIDER: NORMAL
LAB DIRECTOR NAME PROVIDER: NORMAL
LABORATORY COMMENT REPORT: NORMAL
LIMITATIONS OF THE TEST: NORMAL
NEGATIVE PREDICTIVE VALUE: NORMAL
PERFORMANCE CHARACTERISTICS: NORMAL
POSITIVE PREDICTIVE VALUE: NORMAL
REF LAB TEST METHOD: NORMAL
SERVICE CMNT-IMP: NORMAL
TEST PERFORMANCE INFO SPEC: NORMAL
TRIOSOMY 16: NOT DETECTED
TRISOMY 22: NOT DETECTED

## 2025-02-25 LAB
APTT HEX PL PPP: 5 SEC
APTT IMM NP PPP: NORMAL SEC
APTT PPP 1:1 SALINE: NORMAL SEC
APTT PPP: 24.5 SEC
B2 GLYCOPROT1 IGA SER-ACNC: <10 SAU
B2 GLYCOPROT1 IGG SER-ACNC: <10 SGU
B2 GLYCOPROT1 IGM SER-ACNC: <10 SMU
CARDIOLIPIN IGG SER IA-ACNC: <10 GPL
CARDIOLIPIN IGM SER IA-ACNC: <10 MPL
CONFIRM APTT: 2.2 SEC
CONFIRM DRVVT: NORMAL SEC
DRVVT SCREEN TO CONFIRM RATIO: NORMAL RATIO
INR PPP: 1 RATIO
LABORATORY COMMENT REPORT: NORMAL
PROTHROMBIN TIME: 10.7 SEC
SCREEN DRVVT: 31.6 SEC
THROMBIN TIME: 15.8 SEC

## 2025-03-10 ENCOUNTER — TELEPHONE (OUTPATIENT)
Dept: OBSTETRICS AND GYNECOLOGY | Facility: CLINIC | Age: 30
End: 2025-03-10

## 2025-03-10 NOTE — TELEPHONE ENCOUNTER
Caller: Mely Esquivel    Relationship:  Self    Best call back number: [phone]     PATIENT CALLED REQUESTING TO CANCEL SAME DAY APPT.    Did the patient call AFTER the start time of their scheduled appointment?  []YES  [x]NO    Was the patient's appointment rescheduled? []YES  [x]NO HUB UNABLE TO TRANSFER CALL TO RESCHEDULE FOR THIS WEEK.    Any additional information: PATIENT CALLED TO RESCHEDULE TODAY'S OB FOLLOW UP AT 8:40 AM WITH  DUE TO HEAD COLD AND LOW GRADE FEVER.

## 2025-03-12 ENCOUNTER — ROUTINE PRENATAL (OUTPATIENT)
Dept: OBSTETRICS AND GYNECOLOGY | Facility: CLINIC | Age: 30
End: 2025-03-12
Payer: COMMERCIAL

## 2025-03-12 VITALS — DIASTOLIC BLOOD PRESSURE: 70 MMHG | BODY MASS INDEX: 32.31 KG/M2 | WEIGHT: 171 LBS | SYSTOLIC BLOOD PRESSURE: 120 MMHG

## 2025-03-12 DIAGNOSIS — Z87.51 HISTORY OF PRETERM DELIVERY: ICD-10-CM

## 2025-03-12 DIAGNOSIS — Z34.92 SECOND TRIMESTER PREGNANCY: Primary | ICD-10-CM

## 2025-03-12 PROBLEM — N92.6 IRREGULAR MENSES: Status: RESOLVED | Noted: 2020-09-15 | Resolved: 2025-03-12

## 2025-03-12 PROBLEM — Z34.81 MULTIGRAVIDA IN FIRST TRIMESTER: Status: RESOLVED | Noted: 2025-02-10 | Resolved: 2025-03-12

## 2025-03-12 LAB
GLUCOSE UR STRIP-MCNC: NEGATIVE MG/DL
PROT UR STRIP-MCNC: NEGATIVE MG/DL

## 2025-03-12 NOTE — PROGRESS NOTES
OB FOLLOW UP  CC- Here for care of pregnancy        Mely Esquivel is a 29 y.o.  14w1d patient being seen today for her obstetrical follow up visit. Patient reports no complaints. Rev NOB labs/O-. Pt feels well, no c/o. Declines flu vac    Her prenatal care is complicated by (and status) :   Patient Active Problem List   Diagnosis    Female hirsutism    Anxiety    Bulimia    History of  delivery    Second trimester pregnancy       Genetic testing?: already completed and was normal.  NOB labs reviewed  Flu Status: Declines  Ultrasound Today: No    ROS -   Patient Denies: leaking of fluid, vaginal bleeding, dysuria, excessive vomiting, and more than 6 contractions per hour  Fetal Movement: absent  Other than what is documented in the HPI, all other systems reviewed and are negative.     The additional following portions of the patient's history were reviewed and updated as appropriate: allergies, current medications, past family history, past medical history, past social history, past surgical history, and problem list.    I have reviewed and agree with the HPI, ROS, and historical information as entered above. Kath Irizarry MD          /70   Wt 77.6 kg (171 lb)   LMP 2024   BMI 32.31 kg/m²         EXAM:     Prenatal Vitals  BP: 120/70  Weight: 77.6 kg (171 lb)   Fetal Heart Rate: 155          Urine Glucose Read-only: Negative  Urine Protein Read-only: Negative       Assessment and Plan    Problem List Items Addressed This Visit       History of  delivery    Overview   Plan cervical ultrasound 16wk         Relevant Orders    US Ob Transvaginal    US Ob Transvaginal    US Ob 14 + Weeks Single or First Gestation    Second trimester pregnancy - Primary    Relevant Orders    POC Urinalysis Dipstick (Completed)    US Ob Transvaginal    US Ob 14 + Weeks Single or First Gestation       Pregnancy at 14w1d  Labs reviewed from New OB Visit.  Counseled on genetic testing, carrier  status and option for NT screen  Activity and Exercise discussed.  U/S ordered at follow up  Patient is on Prenatal vitamins  Return in about 3 weeks (around 4/2/2025) for WITH SONO (CL sono 3wk and then anatomy 3wk later).    Kath Irizarry MD  03/12/2025

## 2025-03-31 ENCOUNTER — ROUTINE PRENATAL (OUTPATIENT)
Dept: OBSTETRICS AND GYNECOLOGY | Facility: CLINIC | Age: 30
End: 2025-03-31
Payer: COMMERCIAL

## 2025-03-31 VITALS — WEIGHT: 173 LBS | BODY MASS INDEX: 32.69 KG/M2 | DIASTOLIC BLOOD PRESSURE: 72 MMHG | SYSTOLIC BLOOD PRESSURE: 118 MMHG

## 2025-03-31 DIAGNOSIS — Z34.92 SECOND TRIMESTER PREGNANCY: ICD-10-CM

## 2025-03-31 DIAGNOSIS — Z87.51 HISTORY OF PRETERM DELIVERY: Primary | ICD-10-CM

## 2025-03-31 LAB
GLUCOSE UR STRIP-MCNC: NEGATIVE MG/DL
PROT UR STRIP-MCNC: NEGATIVE MG/DL

## 2025-03-31 NOTE — PROGRESS NOTES
OB FOLLOW UP  CC- Here for care of pregnancy        Mely Esquivel is a 29 y.o.  16w6d patient being seen today for her obstetrical follow up visit. Patient reports no complaints. Pt declines AFP.     Her prenatal care is complicated by (and status) : see below.  Patient Active Problem List   Diagnosis    Female hirsutism    Anxiety    Bulimia    History of  delivery    Second trimester pregnancy       Flu Status: Declines  Ultrasound Today: Yes. . Placenta anterior. CL 47.5mm. There is a subcentimeter anechoic cyst within the myometrium. Pending physician review.     AFP: declines    ROS -   Patient Denies: leaking of fluid, vaginal bleeding, dysuria, excessive vomiting, and more than 6 contractions per hour  Fetal Movement: absent  Other than what is documented in the HPI, all other systems reviewed and are negative.       The additional following portions of the patient's history were reviewed and updated as appropriate: allergies, current medications, past family history, past medical history, past social history, past surgical history, and problem list.      I have reviewed and agree with the HPI, ROS, and historical information as entered above. ALIREZA Gibson          EXAM:     Prenatal Vitals  BP: 118/72  Weight: 78.5 kg (173 lb)   Fetal Heart Rate: 145         Urine Glucose Read-only: Negative  Urine Protein Read-only: Negative           Assessment and Plan    Problem List Items Addressed This Visit       History of  delivery - Primary    Overview   Plan cervical ultrasound 16wk         Second trimester pregnancy    Relevant Orders    POC Urinalysis Dipstick (Completed)       Pregnancy at 16w6d  Fetal status reassuring.   Counseled on MSAFP alone in relation to OTD and placental issues.    Anatomy scan next visit. Will reevaluate myometrial cyst and cervical length at that time.   Activity and Exercise discussed.  Patient is on Prenatal vitamins  Return in about 3 weeks  (around 4/21/2025) for Next scheduled follow up, lola Irizarry.    Zahraa Bangura, ALIREZA  03/31/2025

## 2025-04-05 DIAGNOSIS — Z87.51 HISTORY OF PRETERM DELIVERY: Primary | ICD-10-CM

## 2025-04-05 RX ORDER — PROGESTERONE 200 MG/1
200 CAPSULE ORAL DAILY
Qty: 90 CAPSULE | Refills: 1 | Status: SHIPPED | OUTPATIENT
Start: 2025-04-05

## 2025-04-14 ENCOUNTER — ROUTINE PRENATAL (OUTPATIENT)
Dept: OBSTETRICS AND GYNECOLOGY | Facility: CLINIC | Age: 30
End: 2025-04-14
Payer: COMMERCIAL

## 2025-04-14 VITALS — WEIGHT: 173.4 LBS | SYSTOLIC BLOOD PRESSURE: 118 MMHG | DIASTOLIC BLOOD PRESSURE: 70 MMHG | BODY MASS INDEX: 32.76 KG/M2

## 2025-04-14 DIAGNOSIS — Z87.51 HISTORY OF PRETERM DELIVERY: Primary | ICD-10-CM

## 2025-04-14 DIAGNOSIS — Z34.92 SECOND TRIMESTER PREGNANCY: ICD-10-CM

## 2025-04-14 DIAGNOSIS — N89.8 VAGINAL DISCHARGE: ICD-10-CM

## 2025-04-14 DIAGNOSIS — Z34.92 PRENATAL CARE IN SECOND TRIMESTER, UNSPECIFIED GRAVIDITY: ICD-10-CM

## 2025-04-14 LAB
GLUCOSE UR STRIP-MCNC: NEGATIVE MG/DL
PROT UR STRIP-MCNC: NEGATIVE MG/DL

## 2025-04-14 PROCEDURE — 0502F SUBSEQUENT PRENATAL CARE: CPT | Performed by: OBSTETRICS & GYNECOLOGY

## 2025-04-14 NOTE — PROGRESS NOTES
OB FOLLOW UP  CC- Here for care of pregnancy        Mely Esquivel is a 30 y.o.  18w6d patient being seen today for her obstetrical follow up visit. Patient reports intermittent green thick vaginal discharge that started at beginning of pregnancy, but has increased recently. She denies vaginal odor, itching, or irritation.     Her prenatal care is complicated by (and status) : see below.  Patient Active Problem List   Diagnosis    Female hirsutism    Anxiety    Bulimia    History of  delivery    Second trimester pregnancy    Vaginal discharge       Ultrasound Today: Yes    AFP: declines    ROS -   Patient Denies: leaking of fluid, vaginal bleeding, dysuria, excessive vomiting, and more than 6 contractions per hour  Fetal Movement: absent  Other than what is documented in the HPI, all other systems reviewed and are negative.       The additional following portions of the patient's history were reviewed and updated as appropriate: allergies, current medications, past family history, past medical history, past social history, past surgical history, and problem list.      I have reviewed and agree with the HPI, ROS, and historical information as entered above. Kath Irizarry MD          EXAM:     Prenatal Vitals  BP: 118/70  Weight: 78.7 kg (173 lb 6.4 oz)   Fetal Heart Rate: 147         Urine Glucose Read-only: Negative  Urine Protein Read-only: Negative           Assessment and Plan    Problem List Items Addressed This Visit       History of  delivery - Primary    Overview   Plan cervical ultrasound 16wk         Second trimester pregnancy    Vaginal discharge    Relevant Orders    NuSwab BV & Candida - Swab, Vagina     Other Visit Diagnoses         Prenatal care in second trimester, unspecified         Relevant Orders    US Ob 14 + Weeks Single or First Gestation    POC Urinalysis Dipstick (Completed)            Pregnancy at 18w6d  Fetal status reassuring.   Counseled on MSAFP  alone in relation to OTD and placental issues.    Anatomy scan next visit.   Activity and Exercise discussed.  Lab(s) Ordered  U/S ordered at follow up  Patient is on Prenatal vitamins  Return in about 2 weeks (around 4/28/2025) for WITH SONO.    Kath Irizarry MD  04/14/2025

## 2025-04-16 LAB
A VAGINAE DNA VAG QL NAA+PROBE: NORMAL SCORE
BVAB2 DNA VAG QL NAA+PROBE: NORMAL SCORE
C ALBICANS DNA VAG QL NAA+PROBE: NEGATIVE
C GLABRATA DNA VAG QL NAA+PROBE: NEGATIVE
MEGA1 DNA VAG QL NAA+PROBE: NORMAL SCORE

## 2025-04-23 ENCOUNTER — ROUTINE PRENATAL (OUTPATIENT)
Dept: OBSTETRICS AND GYNECOLOGY | Facility: CLINIC | Age: 30
End: 2025-04-23
Payer: COMMERCIAL

## 2025-04-23 VITALS — DIASTOLIC BLOOD PRESSURE: 70 MMHG | WEIGHT: 177.4 LBS | SYSTOLIC BLOOD PRESSURE: 106 MMHG | BODY MASS INDEX: 33.52 KG/M2

## 2025-04-23 DIAGNOSIS — Z34.82 MULTIGRAVIDA IN SECOND TRIMESTER: Primary | ICD-10-CM

## 2025-04-23 DIAGNOSIS — Z87.51 HISTORY OF PRETERM DELIVERY: ICD-10-CM

## 2025-04-23 LAB
GLUCOSE UR STRIP-MCNC: NEGATIVE MG/DL
PROT UR STRIP-MCNC: NEGATIVE MG/DL

## 2025-04-23 RX ORDER — ASPIRIN 81 MG/1
81 TABLET ORAL DAILY
COMMUNITY

## 2025-04-23 NOTE — PROGRESS NOTES
OB FOLLOW UP  CC- Here for care of pregnancy        Mely Esquivel is a 30 y.o.  20w1d patient being seen today for her obstetrical follow up visit. Patient reports no complaints.     Her prenatal care is complicated by (and status) : see below.  Patient Active Problem List   Diagnosis    Female hirsutism    Anxiety    Bulimia    History of  delivery    Second trimester pregnancy    Vaginal discharge    Multigravida in second trimester       Flu Status: Declines  Ultrasound Today: Yes  AFP was declined.    ROS -     Patient Denies: leaking of fluid, vaginal bleeding, dysuria, excessive vomiting, and more than 6 contractions per hour  Fetal Movement : Yes  Other than what is documented in the HPI, all other systems reviewed and are negative.       The additional following portions of the patient's history were reviewed and updated as appropriate: allergies, current medications, past family history, past medical history, past social history, past surgical history, and problem list.      I have reviewed and agree with the HPI, ROS, and historical information as entered above. Kath Irizarry MD      /70   Wt 80.5 kg (177 lb 6.4 oz)   LMP 2024   BMI 33.52 kg/m²       EXAM:     Prenatal Vitals  BP: 106/70  Weight: 80.5 kg (177 lb 6.4 oz)                      Assessment and Plan    Problem List Items Addressed This Visit       History of  delivery    Overview   Plan cervical ultrasound 16wk         Multigravida in second trimester - Primary    Relevant Orders    POC Urinalysis Dipstick       Pregnancy at 20w1d  Anatomy scan today is incomplete, follow up in 4 weeks for additional views. Anatomy that was visualized was within normal limits.  Fetal status reassuring.   Activity and Exercise discussed.  U/S ordered at follow up  Patient is on Prenatal vitamins  Return in about 4 weeks (around 2025) for WITH SONO.      Kath Irizarry MD  2025

## 2025-05-20 DIAGNOSIS — Z34.82 MULTIGRAVIDA IN SECOND TRIMESTER: Primary | ICD-10-CM

## 2025-05-21 ENCOUNTER — ROUTINE PRENATAL (OUTPATIENT)
Dept: OBSTETRICS AND GYNECOLOGY | Facility: CLINIC | Age: 30
End: 2025-05-21
Payer: COMMERCIAL

## 2025-05-21 ENCOUNTER — PREP FOR SURGERY (OUTPATIENT)
Dept: OTHER | Facility: HOSPITAL | Age: 30
End: 2025-05-21
Payer: COMMERCIAL

## 2025-05-21 ENCOUNTER — HOSPITAL ENCOUNTER (INPATIENT)
Facility: HOSPITAL | Age: 30
LOS: 3 days | Discharge: HOME | End: 2025-05-24
Attending: OBSTETRICS & GYNECOLOGY | Admitting: OBSTETRICS & GYNECOLOGY
Payer: COMMERCIAL

## 2025-05-21 VITALS — SYSTOLIC BLOOD PRESSURE: 122 MMHG | DIASTOLIC BLOOD PRESSURE: 74 MMHG | BODY MASS INDEX: 33.44 KG/M2 | WEIGHT: 177 LBS

## 2025-05-21 DIAGNOSIS — Z34.92 SECOND TRIMESTER PREGNANCY: Primary | ICD-10-CM

## 2025-05-21 PROBLEM — Z67.91 RH NEGATIVE STATUS DURING PREGNANCY IN SECOND TRIMESTER: Status: ACTIVE | Noted: 2025-05-21

## 2025-05-21 PROBLEM — O26.872 CERVICAL SHORTENING IN SECOND TRIMESTER: Status: ACTIVE | Noted: 2025-05-21

## 2025-05-21 PROBLEM — O34.30: Status: ACTIVE | Noted: 2025-05-21

## 2025-05-21 PROBLEM — O26.892 RH NEGATIVE STATUS DURING PREGNANCY IN SECOND TRIMESTER: Status: ACTIVE | Noted: 2025-05-21

## 2025-05-21 LAB
ABO GROUP BLD: NORMAL
ABO GROUP BLD: NORMAL
ALBUMIN SERPL-MCNC: 4 G/DL (ref 3.5–5.2)
ALBUMIN/GLOB SERPL: 1.2 G/DL
ALP SERPL-CCNC: 53 U/L (ref 39–117)
ALT SERPL W P-5'-P-CCNC: 21 U/L (ref 1–33)
ANION GAP SERPL CALCULATED.3IONS-SCNC: 14 MMOL/L (ref 5–15)
AST SERPL-CCNC: 20 U/L (ref 1–32)
BILIRUB SERPL-MCNC: 0.2 MG/DL (ref 0–1.2)
BLD GP AB SCN SERPL QL: NEGATIVE
BUN SERPL-MCNC: 7 MG/DL (ref 6–20)
BUN/CREAT SERPL: 13.2 (ref 7–25)
CALCIUM SPEC-SCNC: 9.6 MG/DL (ref 8.6–10.5)
CHLORIDE SERPL-SCNC: 104 MMOL/L (ref 98–107)
CO2 SERPL-SCNC: 22 MMOL/L (ref 22–29)
CREAT SERPL-MCNC: 0.53 MG/DL (ref 0.57–1)
DEPRECATED RDW RBC AUTO: 42 FL (ref 37–54)
EGFRCR SERPLBLD CKD-EPI 2021: 128.6 ML/MIN/1.73
ERYTHROCYTE [DISTWIDTH] IN BLOOD BY AUTOMATED COUNT: 13.9 % (ref 12.3–15.4)
EXPIRATION DATE: NORMAL
GLOBULIN UR ELPH-MCNC: 3.3 GM/DL
GLUCOSE SERPL-MCNC: 119 MG/DL (ref 65–99)
GLUCOSE UR STRIP-MCNC: NEGATIVE MG/DL
HCT VFR BLD AUTO: 39.2 % (ref 34–46.6)
HGB BLD-MCNC: 12.4 G/DL (ref 12–15.9)
LDH SERPL-CCNC: 154 U/L (ref 135–214)
Lab: NORMAL
MCH RBC QN AUTO: 26.4 PG (ref 26.6–33)
MCHC RBC AUTO-ENTMCNC: 31.6 G/DL (ref 31.5–35.7)
MCV RBC AUTO: 83.4 FL (ref 79–97)
PLATELET # BLD AUTO: 369 10*3/MM3 (ref 140–450)
PMV BLD AUTO: 9.6 FL (ref 6–12)
POTASSIUM SERPL-SCNC: 3.8 MMOL/L (ref 3.5–5.2)
PROT SERPL-MCNC: 7.3 G/DL (ref 6–8.5)
PROT UR STRIP-MCNC: NEGATIVE MG/DL
PROT UR STRIP-MCNC: NEGATIVE MG/DL
RBC # BLD AUTO: 4.7 10*6/MM3 (ref 3.77–5.28)
RH BLD: NEGATIVE
RH BLD: NEGATIVE
SODIUM SERPL-SCNC: 140 MMOL/L (ref 136–145)
T&S EXPIRATION DATE: NORMAL
TREPONEMA PALLIDUM IGG+IGM AB [PRESENCE] IN SERUM OR PLASMA BY IMMUNOASSAY: NORMAL
URATE SERPL-MCNC: 3.3 MG/DL (ref 2.4–5.7)
WBC NRBC COR # BLD AUTO: 12.49 10*3/MM3 (ref 3.4–10.8)

## 2025-05-21 PROCEDURE — 80053 COMPREHEN METABOLIC PANEL: CPT | Performed by: OBSTETRICS & GYNECOLOGY

## 2025-05-21 PROCEDURE — 86850 RBC ANTIBODY SCREEN: CPT | Performed by: OBSTETRICS & GYNECOLOGY

## 2025-05-21 PROCEDURE — 99222 1ST HOSP IP/OBS MODERATE 55: CPT | Performed by: OBSTETRICS & GYNECOLOGY

## 2025-05-21 PROCEDURE — 25010000002 MAGNESIUM SULFATE 20 GM/500ML SOLUTION: Performed by: OBSTETRICS & GYNECOLOGY

## 2025-05-21 PROCEDURE — 85027 COMPLETE CBC AUTOMATED: CPT | Performed by: OBSTETRICS & GYNECOLOGY

## 2025-05-21 PROCEDURE — 86901 BLOOD TYPING SEROLOGIC RH(D): CPT

## 2025-05-21 PROCEDURE — 83615 LACTATE (LD) (LDH) ENZYME: CPT | Performed by: OBSTETRICS & GYNECOLOGY

## 2025-05-21 PROCEDURE — 25010000002 CEFAZOLIN PER 500 MG: Performed by: OBSTETRICS & GYNECOLOGY

## 2025-05-21 PROCEDURE — 86900 BLOOD TYPING SEROLOGIC ABO: CPT | Performed by: OBSTETRICS & GYNECOLOGY

## 2025-05-21 PROCEDURE — 86901 BLOOD TYPING SEROLOGIC RH(D): CPT | Performed by: OBSTETRICS & GYNECOLOGY

## 2025-05-21 PROCEDURE — 84156 ASSAY OF PROTEIN URINE: CPT | Performed by: OBSTETRICS & GYNECOLOGY

## 2025-05-21 PROCEDURE — 86780 TREPONEMA PALLIDUM: CPT | Performed by: OBSTETRICS & GYNECOLOGY

## 2025-05-21 PROCEDURE — 25010000002 ONDANSETRON PER 1 MG: Performed by: OBSTETRICS & GYNECOLOGY

## 2025-05-21 PROCEDURE — 86900 BLOOD TYPING SEROLOGIC ABO: CPT

## 2025-05-21 PROCEDURE — 25810000003 LACTATED RINGERS PER 1000 ML: Performed by: OBSTETRICS & GYNECOLOGY

## 2025-05-21 PROCEDURE — 82570 ASSAY OF URINE CREATININE: CPT | Performed by: OBSTETRICS & GYNECOLOGY

## 2025-05-21 PROCEDURE — 25010000002 BETAMETHASONE ACET & SOD PHOS PER 4 MG: Performed by: OBSTETRICS & GYNECOLOGY

## 2025-05-21 PROCEDURE — 84550 ASSAY OF BLOOD/URIC ACID: CPT | Performed by: OBSTETRICS & GYNECOLOGY

## 2025-05-21 RX ORDER — BETAMETHASONE SODIUM PHOSPHATE AND BETAMETHASONE ACETATE 3; 3 MG/ML; MG/ML
12 INJECTION, SUSPENSION INTRA-ARTICULAR; INTRALESIONAL; INTRAMUSCULAR; SOFT TISSUE EVERY 24 HOURS
Status: COMPLETED | OUTPATIENT
Start: 2025-05-21 | End: 2025-05-22

## 2025-05-21 RX ORDER — CALCIUM GLUCONATE 94 MG/ML
1 INJECTION, SOLUTION INTRAVENOUS AS NEEDED
Status: CANCELLED | OUTPATIENT
Start: 2025-05-21 | End: 2025-05-23

## 2025-05-21 RX ORDER — INDOMETHACIN 25 MG/1
25 CAPSULE ORAL EVERY 4 HOURS
Status: COMPLETED | OUTPATIENT
Start: 2025-05-21 | End: 2025-05-22

## 2025-05-21 RX ORDER — SODIUM CHLORIDE 0.9 % (FLUSH) 0.9 %
10 SYRINGE (ML) INJECTION EVERY 12 HOURS SCHEDULED
Status: CANCELLED | OUTPATIENT
Start: 2025-05-21

## 2025-05-21 RX ORDER — BISACODYL 10 MG
10 SUPPOSITORY, RECTAL RECTAL DAILY PRN
Status: CANCELLED | OUTPATIENT
Start: 2025-05-21

## 2025-05-21 RX ORDER — MAGNESIUM SULFATE HEPTAHYDRATE 40 MG/ML
1 INJECTION, SOLUTION INTRAVENOUS CONTINUOUS
Status: DISCONTINUED | OUTPATIENT
Start: 2025-05-21 | End: 2025-05-23

## 2025-05-21 RX ORDER — DOCUSATE SODIUM 100 MG/1
100 CAPSULE, LIQUID FILLED ORAL 2 TIMES DAILY PRN
Status: CANCELLED | OUTPATIENT
Start: 2025-05-21

## 2025-05-21 RX ORDER — SODIUM CHLORIDE, SODIUM LACTATE, POTASSIUM CHLORIDE, CALCIUM CHLORIDE 600; 310; 30; 20 MG/100ML; MG/100ML; MG/100ML; MG/100ML
75 INJECTION, SOLUTION INTRAVENOUS CONTINUOUS
Status: ACTIVE | OUTPATIENT
Start: 2025-05-21 | End: 2025-05-23

## 2025-05-21 RX ORDER — SODIUM CHLORIDE 0.9 % (FLUSH) 0.9 %
10 SYRINGE (ML) INJECTION AS NEEDED
Status: CANCELLED | OUTPATIENT
Start: 2025-05-21

## 2025-05-21 RX ORDER — LIDOCAINE HYDROCHLORIDE 10 MG/ML
0.5 INJECTION, SOLUTION EPIDURAL; INFILTRATION; INTRACAUDAL; PERINEURAL ONCE AS NEEDED
Status: DISCONTINUED | OUTPATIENT
Start: 2025-05-21 | End: 2025-05-24 | Stop reason: HOSPADM

## 2025-05-21 RX ORDER — INDOMETHACIN 50 MG/1
50 CAPSULE ORAL ONCE
Status: CANCELLED | OUTPATIENT
Start: 2025-05-21 | End: 2025-05-21

## 2025-05-21 RX ORDER — CALCIUM GLUCONATE 94 MG/ML
1 INJECTION, SOLUTION INTRAVENOUS AS NEEDED
Status: ACTIVE | OUTPATIENT
Start: 2025-05-21 | End: 2025-05-23

## 2025-05-21 RX ORDER — CALCIUM CARBONATE 500 MG/1
2 TABLET, CHEWABLE ORAL DAILY PRN
Status: CANCELLED | OUTPATIENT
Start: 2025-05-21

## 2025-05-21 RX ORDER — CALCIUM CARBONATE 500 MG/1
2 TABLET, CHEWABLE ORAL DAILY PRN
Status: DISCONTINUED | OUTPATIENT
Start: 2025-05-21 | End: 2025-05-24 | Stop reason: HOSPADM

## 2025-05-21 RX ORDER — INDOMETHACIN 25 MG/1
50 CAPSULE ORAL ONCE
Status: COMPLETED | OUTPATIENT
Start: 2025-05-21 | End: 2025-05-21

## 2025-05-21 RX ORDER — DOCUSATE SODIUM 100 MG/1
100 CAPSULE, LIQUID FILLED ORAL 2 TIMES DAILY PRN
Status: DISCONTINUED | OUTPATIENT
Start: 2025-05-21 | End: 2025-05-24 | Stop reason: HOSPADM

## 2025-05-21 RX ORDER — INDOMETHACIN 25 MG/1
25 CAPSULE ORAL EVERY 4 HOURS
Status: CANCELLED | OUTPATIENT
Start: 2025-05-21

## 2025-05-21 RX ORDER — MAGNESIUM SULFATE HEPTAHYDRATE 40 MG/ML
2 INJECTION, SOLUTION INTRAVENOUS CONTINUOUS
Status: CANCELLED | OUTPATIENT
Start: 2025-05-21 | End: 2025-05-23

## 2025-05-21 RX ORDER — SODIUM CHLORIDE 9 MG/ML
40 INJECTION, SOLUTION INTRAVENOUS AS NEEDED
Status: DISCONTINUED | OUTPATIENT
Start: 2025-05-21 | End: 2025-05-24 | Stop reason: HOSPADM

## 2025-05-21 RX ORDER — SODIUM CHLORIDE 0.9 % (FLUSH) 0.9 %
10 SYRINGE (ML) INJECTION AS NEEDED
Status: DISCONTINUED | OUTPATIENT
Start: 2025-05-21 | End: 2025-05-24 | Stop reason: HOSPADM

## 2025-05-21 RX ORDER — ONDANSETRON 2 MG/ML
4 INJECTION INTRAMUSCULAR; INTRAVENOUS EVERY 8 HOURS PRN
Status: DISCONTINUED | OUTPATIENT
Start: 2025-05-21 | End: 2025-05-24 | Stop reason: HOSPADM

## 2025-05-21 RX ORDER — ACETAMINOPHEN 325 MG/1
650 TABLET ORAL EVERY 4 HOURS PRN
Status: CANCELLED | OUTPATIENT
Start: 2025-05-21

## 2025-05-21 RX ORDER — PROGESTERONE 100 MG/1
200 CAPSULE ORAL NIGHTLY
Status: COMPLETED | OUTPATIENT
Start: 2025-05-21 | End: 2025-05-23

## 2025-05-21 RX ORDER — ONDANSETRON 4 MG/1
8 TABLET, ORALLY DISINTEGRATING ORAL EVERY 8 HOURS PRN
Status: CANCELLED | OUTPATIENT
Start: 2025-05-21

## 2025-05-21 RX ORDER — BISACODYL 10 MG
10 SUPPOSITORY, RECTAL RECTAL DAILY PRN
Status: DISCONTINUED | OUTPATIENT
Start: 2025-05-21 | End: 2025-05-24 | Stop reason: HOSPADM

## 2025-05-21 RX ORDER — BETAMETHASONE SODIUM PHOSPHATE AND BETAMETHASONE ACETATE 3; 3 MG/ML; MG/ML
12 INJECTION, SUSPENSION INTRA-ARTICULAR; INTRALESIONAL; INTRAMUSCULAR; SOFT TISSUE EVERY 24 HOURS
Status: CANCELLED | OUTPATIENT
Start: 2025-05-21 | End: 2025-05-23

## 2025-05-21 RX ORDER — ONDANSETRON 2 MG/ML
4 INJECTION INTRAMUSCULAR; INTRAVENOUS EVERY 8 HOURS PRN
Status: CANCELLED | OUTPATIENT
Start: 2025-05-21

## 2025-05-21 RX ORDER — LIDOCAINE HYDROCHLORIDE 10 MG/ML
0.5 INJECTION, SOLUTION EPIDURAL; INFILTRATION; INTRACAUDAL; PERINEURAL ONCE AS NEEDED
Status: CANCELLED | OUTPATIENT
Start: 2025-05-21

## 2025-05-21 RX ORDER — SODIUM CHLORIDE 0.9 % (FLUSH) 0.9 %
10 SYRINGE (ML) INJECTION EVERY 12 HOURS SCHEDULED
Status: DISCONTINUED | OUTPATIENT
Start: 2025-05-21 | End: 2025-05-24 | Stop reason: HOSPADM

## 2025-05-21 RX ORDER — SODIUM CHLORIDE 9 MG/ML
40 INJECTION, SOLUTION INTRAVENOUS AS NEEDED
Status: CANCELLED | OUTPATIENT
Start: 2025-05-21

## 2025-05-21 RX ORDER — BUPIVACAINE HCL/0.9 % NACL/PF 0.1 %
2000 PLASTIC BAG, INJECTION (ML) EPIDURAL ONCE
Status: CANCELLED | OUTPATIENT
Start: 2025-05-21

## 2025-05-21 RX ORDER — ACETAMINOPHEN 325 MG/1
650 TABLET ORAL EVERY 4 HOURS PRN
Status: DISCONTINUED | OUTPATIENT
Start: 2025-05-21 | End: 2025-05-24 | Stop reason: HOSPADM

## 2025-05-21 RX ORDER — ONDANSETRON 4 MG/1
8 TABLET, ORALLY DISINTEGRATING ORAL EVERY 8 HOURS PRN
Status: DISCONTINUED | OUTPATIENT
Start: 2025-05-21 | End: 2025-05-24 | Stop reason: HOSPADM

## 2025-05-21 RX ADMIN — INDOMETHACIN 50 MG: 25 CAPSULE ORAL at 11:42

## 2025-05-21 RX ADMIN — INDOMETHACIN 25 MG: 25 CAPSULE ORAL at 20:12

## 2025-05-21 RX ADMIN — SODIUM CHLORIDE 2000 MG: 900 INJECTION INTRAVENOUS at 11:41

## 2025-05-21 RX ADMIN — ONDANSETRON 4 MG: 2 INJECTION INTRAMUSCULAR; INTRAVENOUS at 11:33

## 2025-05-21 RX ADMIN — SODIUM CHLORIDE, POTASSIUM CHLORIDE, SODIUM LACTATE AND CALCIUM CHLORIDE 75 ML/HR: 600; 310; 30; 20 INJECTION, SOLUTION INTRAVENOUS at 22:35

## 2025-05-21 RX ADMIN — INDOMETHACIN 25 MG: 25 CAPSULE ORAL at 16:15

## 2025-05-21 RX ADMIN — PROGESTERONE 200 MG: 100 CAPSULE ORAL at 21:55

## 2025-05-21 RX ADMIN — CEFAZOLIN 1000 MG: 1 INJECTION, POWDER, FOR SOLUTION INTRAMUSCULAR; INTRAVENOUS at 18:48

## 2025-05-21 RX ADMIN — SODIUM CHLORIDE, POTASSIUM CHLORIDE, SODIUM LACTATE AND CALCIUM CHLORIDE 75 ML/HR: 600; 310; 30; 20 INJECTION, SOLUTION INTRAVENOUS at 11:10

## 2025-05-21 RX ADMIN — INDOMETHACIN 25 MG: 25 CAPSULE ORAL at 23:38

## 2025-05-21 RX ADMIN — BETAMETHASONE SODIUM PHOSPHATE AND BETAMETHASONE ACETATE 12 MG: 3; 3 INJECTION, SUSPENSION INTRA-ARTICULAR; INTRALESIONAL; INTRAMUSCULAR at 11:39

## 2025-05-21 RX ADMIN — MAGNESIUM SULFATE IN WATER 2 G/HR: 20 INJECTION, SOLUTION INTRAVENOUS at 18:47

## 2025-05-21 NOTE — CONSULTS
Maternal Fetal Medicine Admission Note    Patient Name:  Mely Esquivel  :  1995  MRN:  [id number]  Date of Service:  2025  Referring Provider: Kath Irizarry     #: 1    ID:  30 y.o.  at 24w1d    Chief Complaint:   Chief Complaint   Patient presents with    Laboring     Shortened cervix upon admission- chao- mag       Pregnancy course significant for:    Patient Active Problem List   Diagnosis    Female hirsutism    Anxiety    Bulimia    History of  delivery    Second trimester pregnancy    Vaginal discharge    Multigravida in second trimester    Cervical funneling affecting pregnancy       History of Present Illness: Short cervix.  Patient was sent for direct admission from office today secondary to short cervix noted on ultrasound.  Patient with a history of a 28-week delivery in her last pregnancy and had been on vaginal progesterone with cervical length screening that had been very reassuring until today.  Patient overall asymptomatic though does report some abdominal pressure.  No leaking of fluid, vaginal bleeding, contractions.  Patient is unsure why this has happened to her.    Prenatal Labs   Lab Results   Component Value Date    HGB 12.4 2025    HEPBSAG Negative 02/10/2025    LABRPR Non-reactive 2016    ABORH O Rh Negative 2016    ABO O 2025    RH Negative 2025    ABSCRN Negative 2025    ABID Anti-D due to RHIG 2016    UIG0SEE1 Non Reactive 02/10/2025    HEPCVIRUSABY Non Reactive 02/10/2025    URINECX Final report 02/10/2025       OB HISTORY    OB History    Para Term  AB Living   4 1  1 2 1   SAB IAB Ectopic Molar Multiple Live Births    2    1      # Outcome Date GA Lbr Aime/2nd Weight Sex Type Anes PTL Lv   4 Current            3  16 28w0d  1077 g (2 lb 6 oz) M Vaginal unsp  Y MERI      Complications: Placental abruption   2 IAB            1 IAB                PAST MEDICAL HISTORY     Past Medical History:   Diagnosis Date    Abnormal Pap smear of cervix          Frequent UTI     Miscarriage     2 or less    Ovarian cyst     Small    Pregnancy     other than first       PAST SURGICAL HISTORY      has a past surgical history that includes Dilation and curettage of uterus () and Dilation and curettage, diagnostic / therapeutic ().    CURRENT MEDICATIONS       Current Facility-Administered Medications:     acetaminophen (TYLENOL) tablet 650 mg, 650 mg, Oral, Q4H PRN, aKth Irizarry MD    betamethasone acetate-betamethasone sodium phosphate (CELESTONE SOLUSPAN) injection 12 mg, 12 mg, Intramuscular, Q24H, Kath Irizarry MD, 12 mg at 25 1139    bisacodyl (DULCOLAX) suppository 10 mg, 10 mg, Rectal, Daily PRN, Kath Irizarry MD    calcium carbonate (TUMS) chewable tablet 500 mg (200 mg elemental), 2 tablet, Oral, Daily PRN, Kath Irizarry MD    calcium gluconate injection 1 g, 1 g, Intravenous, PRN, Kath Irizarry MD    [COMPLETED] ceFAZolin 2000 mg IVPB in 100 mL NS (MBP), 2,000 mg, Intravenous, Once, 2,000 mg at 25 1141 **FOLLOWED BY** ceFAZolin 1000 mg IVPB in 100 mL NS (MBP), 1,000 mg, Intravenous, Q8H, Kath Irizarry MD    docusate sodium (COLACE) capsule 100 mg, 100 mg, Oral, BID PRN, Kath Irizarry MD    [COMPLETED] indomethacin (INDOCIN) capsule 50 mg, 50 mg, Oral, Once, 50 mg at 25 1142 **FOLLOWED BY** indomethacin (INDOCIN) capsule 25 mg, 25 mg, Oral, Q4H, Kath Irizarry MD    lactated ringers infusion, 75 mL/hr, Intravenous, Continuous, Kath Irizarry MD, Last Rate: 75 mL/hr at 25 1110, 75 mL/hr at 25 1110    lidocaine PF 1% (XYLOCAINE) injection 0.5 mL, 0.5 mL, Intradermal, Once PRN, Kath Irizarry MD    magnesium sulfate 20 GM/500ML infusion, 2 g/hr, Intravenous, Continuous, Kath Irizarry MD, Last Rate: 50 mL/hr at  PROGRESS NOTE:    Josie Lombardo MD  Internal Medicine PGY-1  [phone]/[id number]    Patient is a 80y old  Male who presents with a chief complaint of Abnormal CT Chest findings (25 Jan 2022 11:34)      SUBJECTIVE / OVERNIGHT EVENTS: No acute overnight events. Pt seen and examined. Denies fevers, chills, CP, SOB, Abdominal pain, N/V, Constipation, Diarrhea      MEDICATIONS  (STANDING):  atorvastatin 80 milliGRAM(s) Oral at bedtime  dextrose 40% Gel 15 Gram(s) Oral once  dextrose 5%. 1000 milliLiter(s) (50 mL/Hr) IV Continuous <Continuous>  dextrose 5%. 1000 milliLiter(s) (100 mL/Hr) IV Continuous <Continuous>  dextrose 50% Injectable 25 Gram(s) IV Push once  dextrose 50% Injectable 12.5 Gram(s) IV Push once  dextrose 50% Injectable 25 Gram(s) IV Push once  finasteride 5 milliGRAM(s) Oral daily  glucagon  Injectable 1 milliGRAM(s) IntraMuscular once  influenza  Vaccine (HIGH DOSE) 0.7 milliLiter(s) IntraMuscular once  insulin lispro (ADMELOG) corrective regimen sliding scale   SubCutaneous three times a day before meals  insulin lispro (ADMELOG) corrective regimen sliding scale   SubCutaneous at bedtime  losartan 50 milliGRAM(s) Oral daily    MEDICATIONS  (PRN):  acetaminophen     Tablet .. 650 milliGRAM(s) Oral every 6 hours PRN Temp greater or equal to 38C (100.4F), Mild Pain (1 - 3)  benzocaine 15 mG/menthol 3.6 mG Lozenge 1 Lozenge Oral four times a day PRN Sore Throat  benzonatate 100 milliGRAM(s) Oral every 8 hours PRN Cough      I&O's Summary    25 Jan 2022 07:01  -  26 Jan 2022 06:55  --------------------------------------------------------  IN: 250 mL / OUT: 0 mL / NET: 250 mL        Vital Signs Last 24 Hrs  T(C): 36.3 (26 Jan 2022 06:23), Max: 36.4 (25 Jan 2022 22:04)  T(F): 97.3 (26 Jan 2022 06:23), Max: 97.6 (25 Jan 2022 22:04)  HR: 74 (26 Jan 2022 06:23) (74 - 92)  BP: 141/74 (26 Jan 2022 06:23) (120/60 - 154/65)  BP(mean): 86 (25 Jan 2022 16:15) (73 - 87)  RR: 16 (26 Jan 2022 06:23) (16 - 20)  SpO2: 95% (26 Jan 2022 06:23) (90% - 100%)    =================PHYSICAL EXAM=================    PHYSICAL EXAM:  GENERAL: NAD, lying in bed comfortably  HEAD:  Atraumatic, Normocephalic  EYES: EOMI, PERRLA, conjunctiva and sclera clear  ENT: Moist mucous membranes  NECK: Supple, No JVD  CHEST/LUNG: Clear to auscultation bilaterally; No rales, rhonchi, wheezing, or rubs. Unlabored respirations  HEART: Regular rate and rhythm; No murmurs, rubs, or gallops  ABDOMEN: Bowel sounds present; Soft, Nontender, Nondistended. No hepatomegally  EXTREMITIES:  2+ Peripheral Pulses, brisk capillary refill. No clubbing, cyanosis, or edema  NERVOUS SYSTEM:  Alert & Oriented X3, speech clear. No deficits   MSK: FROM all 4 extremities, full and equal strength  SKIN: No rashes or lesions    =================================================    LABS:                        11.7   7.79  )-----------( 252      ( 25 Jan 2022 07:20 )             36.3     Auto Eosinophil # x     / Auto Eosinophil % x     / Auto Neutrophil # x     / Auto Neutrophil % x     / BANDS % x                            12.6   9.64  )-----------( 267      ( 24 Jan 2022 07:22 )             38.4     Auto Eosinophil # x     / Auto Eosinophil % x     / Auto Neutrophil # x     / Auto Neutrophil % x     / BANDS % x        01-25    137  |  105  |  25<H>  ----------------------------<  86  4.1   |  20<L>  |  1.31<H>  01-24    135  |  103  |  30<H>  ----------------------------<  93  4.5   |  21<L>  |  1.45<H>    Ca    9.0      25 Jan 2022 07:20  Mg     2.20     01-25  Phos  3.5     01-25  TPro  7.7  /  Alb  3.8  /  TBili  0.3  /  DBili  x   /  AST  21  /  ALT  35  /  AlkPhos  119  01-24    PT/INR - ( 25 Jan 2022 07:20 )   PT: 13.1 sec;   INR: 1.16 ratio         PTT - ( 25 Jan 2022 07:20 )  PTT:32.7 sec              RADIOLOGY & ADDITIONAL TESTS:    Imaging Personally Reviewed:    Consultant(s) Notes Reviewed:      Care Discussed with Consultants/Other Providers:   25 1153, 2 g/hr at 25 1153    ondansetron ODT (ZOFRAN-ODT) disintegrating tablet 8 mg, 8 mg, Oral, Q8H PRN **OR** ondansetron (ZOFRAN) injection 4 mg, 4 mg, Intravenous, Q8H PRN, Kath Irizarry MD, 4 mg at 25 1133    sodium chloride 0.9 % flush 10 mL, 10 mL, Intravenous, Q12H, Kath Irizarry MD    sodium chloride 0.9 % flush 10 mL, 10 mL, Intravenous, PRN, Kath Irizarry MD    sodium chloride 0.9 % infusion 40 mL, 40 mL, Intravenous, PRN, Kath Irizarry MD    ALLERGIES     Patient has no known allergies.    SOCIAL HISTORY     Social History     Tobacco Use   Smoking Status Never    Passive exposure: Never   Smokeless Tobacco Never     Social History     Substance and Sexual Activity   Alcohol Use Not Currently    Alcohol/week: 5.0 standard drinks of alcohol    Types: 5 Cans of beer per week    Comment: once per month     Social History     Substance and Sexual Activity   Drug Use Never       FAMILY HISTORY   The patient has a family history of    PHYSICAL EXAMINATION    Vital Signs Range for the last 24 hours  Temperature: Temp:  [98.8 °F (37.1 °C)] 98.8 °F (37.1 °C)   Temp Source:     BP: BP: (114-128)/(67-76) 121/70   Pulse: Heart Rate:  [103-114] 113   Respirations: Resp:  [16-18] 18   Weight: 81.2 kg (179 lb)     Constitutional:   Well developed, well nourished, no acute distress, well-groomed.  Respiratory:  Lungs are clear to auscultation bilaterally, normal breath sounds.   Gastrointestinal:  Soft, gravid, nontender.  Neurologic: no focal deficits noted.   Psychiatric:  Speech and behavior appropriate.       Cervix: Exam by:     Dilation:     Effacement:     Station:           Fetal heart rate tracing review  Non-Stress Test:    Fetal Heart Rate Assessment     Uterine Assessment       Ultrasound:  5mm cervix with funneling and debris with [facility] today.     Other Imaging:    Labs:       ASSESSMENT/PLAN:       30 y.o. female  at 24w1d  with   Cervical funneling affecting pregnancy     Short Cervix  - Patient presents today for asymptomatic short cervix.  No current signs of infection.  - Patient currently on magnesium, Indocin, betamethasone for fetal benefit.  - WBC on admission to evaluate for potential infection, would recommend continuous tocometry.  - We discussed that given her gestational age she is unfortunately not a candidate for cerclage.  - We briefly discussed prematurity at 24 weeks but patient ultimately desires NICU conversation.  We discussed that if there is a concern for  delivery before 28 weeks she would need to be transferred to the [facility] for NICU capabilities.  - Patient would be candidate for history indicated cerclage in her next pregnancy if she were to desire pregnancy again.  - Plan for careful inpatient evaluation for  labor with repeat ultrasound on .  We discussed potential for outpatient versus inpatient management depending on symptoms and tocometry.  We discussed likely modified bedrest for the remainder of pregnancy.      I spent 45 minutes caring for the patient on the day of service. This included: obtaining or reviewing a separately obtained medical history, reviewing patient records, performing a medically appropriate exam and/or evaluation, counseling or educating the patient/family/caregiver, ordering medications, labs, and/or procedures and documenting such in the medical record. This does not include time spent on review and interpretation of other tests such as fetal ultrasound or the performance of other procedures such as amniocentesis or CVS.    Murtaza Fonseca MD     2025   12:12 EDT         PROGRESS NOTE:    Josie Lombardo MD  Internal Medicine PGY-1  [phone]/[id number]    Patient is a 80y old  Male who presents with a chief complaint of Abnormal CT Chest findings (25 Jan 2022 11:34)      SUBJECTIVE / OVERNIGHT EVENTS: No acute overnight events. Pt seen and examined. Patient reported a sore throat after the bronchoscopy yesterrday, however it has resolved as of today. He states that he feels fine and that his cough is improving. Denies fevers, chills, CP, SOB, Abdominal pain, N/V, Constipation, Diarrhea      MEDICATIONS  (STANDING):  atorvastatin 80 milliGRAM(s) Oral at bedtime  dextrose 40% Gel 15 Gram(s) Oral once  dextrose 5%. 1000 milliLiter(s) (50 mL/Hr) IV Continuous <Continuous>  dextrose 5%. 1000 milliLiter(s) (100 mL/Hr) IV Continuous <Continuous>  dextrose 50% Injectable 25 Gram(s) IV Push once  dextrose 50% Injectable 12.5 Gram(s) IV Push once  dextrose 50% Injectable 25 Gram(s) IV Push once  finasteride 5 milliGRAM(s) Oral daily  glucagon  Injectable 1 milliGRAM(s) IntraMuscular once  influenza  Vaccine (HIGH DOSE) 0.7 milliLiter(s) IntraMuscular once  insulin lispro (ADMELOG) corrective regimen sliding scale   SubCutaneous three times a day before meals  insulin lispro (ADMELOG) corrective regimen sliding scale   SubCutaneous at bedtime  losartan 50 milliGRAM(s) Oral daily    MEDICATIONS  (PRN):  acetaminophen     Tablet .. 650 milliGRAM(s) Oral every 6 hours PRN Temp greater or equal to 38C (100.4F), Mild Pain (1 - 3)  benzocaine 15 mG/menthol 3.6 mG Lozenge 1 Lozenge Oral four times a day PRN Sore Throat  benzonatate 100 milliGRAM(s) Oral every 8 hours PRN Cough      I&O's Summary    25 Jan 2022 07:01  -  26 Jan 2022 06:55  --------------------------------------------------------  IN: 250 mL / OUT: 0 mL / NET: 250 mL        Vital Signs Last 24 Hrs  T(C): 36.3 (26 Jan 2022 06:23), Max: 36.4 (25 Jan 2022 22:04)  T(F): 97.3 (26 Jan 2022 06:23), Max: 97.6 (25 Jan 2022 22:04)  HR: 74 (26 Jan 2022 06:23) (74 - 92)  BP: 141/74 (26 Jan 2022 06:23) (120/60 - 154/65)  BP(mean): 86 (25 Jan 2022 16:15) (73 - 87)  RR: 16 (26 Jan 2022 06:23) (16 - 20)  SpO2: 95% (26 Jan 2022 06:23) (90% - 100%)    =================PHYSICAL EXAM=================    GENERAL: Older gentleman sitting up in chair in NAD  HEAD:  Atraumatic, Normocephalic  EYES: EOMI, PERRLA, conjunctiva and sclera clear  ENT: Moist mucous membranes  CHEST/LUNG: Clear to auscultation bilaterally; No rales, rhonchi, wheezing, or rubs  HEART: Regular rate and rhythm; No murmurs, rubs, or gallops  ABDOMEN: Bowel sounds present; Soft, Nontender, Nondistended. No hepatomegaly  EXTREMITIES:  2+ Peripheral Pulses, brisk capillary refill. No clubbing, cyanosis, or edema  NERVOUS SYSTEM:  Alert & Oriented X3, speech clear. No deficits   MSK: FROM all 4 extremities, full and equal strength    =================================================    LABS:                        11.7   7.79  )-----------( 252      ( 25 Jan 2022 07:20 )             36.3     Auto Eosinophil # x     / Auto Eosinophil % x     / Auto Neutrophil # x     / Auto Neutrophil % x     / BANDS % x                            12.6   9.64  )-----------( 267      ( 24 Jan 2022 07:22 )             38.4     Auto Eosinophil # x     / Auto Eosinophil % x     / Auto Neutrophil # x     / Auto Neutrophil % x     / BANDS % x        01-25    137  |  105  |  25<H>  ----------------------------<  86  4.1   |  20<L>  |  1.31<H>  01-24    135  |  103  |  30<H>  ----------------------------<  93  4.5   |  21<L>  |  1.45<H>    Ca    9.0      25 Jan 2022 07:20  Mg     2.20     01-25  Phos  3.5     01-25  TPro  7.7  /  Alb  3.8  /  TBili  0.3  /  DBili  x   /  AST  21  /  ALT  35  /  AlkPhos  119  01-24    PT/INR - ( 25 Jan 2022 07:20 )   PT: 13.1 sec;   INR: 1.16 ratio         PTT - ( 25 Jan 2022 07:20 )  PTT:32.7 sec              RADIOLOGY & ADDITIONAL TESTS:    Imaging Personally Reviewed:    Consultant(s) Notes Reviewed:      Care Discussed with Consultants/Other Providers:

## 2025-05-21 NOTE — H&P
[facility]  Obstetric History and Physical    Chief Complaint   Patient presents with    Laboring     Shortened cervix upon admission- chao- mag       Subjective     Patient is a 30 y.o. female  currently at 24w1d, who presented for routine OB visit today but on f/u anatomy scan, was found to have cervical shortening (5mm residual) and funneling.  She was noted to have mucusy discharge and abnormal appearance to cervix at 16wk and was started on progesterone vaginally.  On repeat ultrasound, cervix appeared closed and >2cm.  However, the funneling appearance was present today again.    Pt. Denies LOF, VB, ctx's.  Maybe some pelvic pressure.    Her prenatal care is otherwise benign.  Her previous obstetric/gynecological history is noted for previous  delivery.    The following portions of the patients history were reviewed and updated as appropriate: current medications, allergies, past medical history, past surgical history, past family history, past social history, and problem list .       Prenatal Information:  Prenatal Results       Initial Prenatal Labs       Test Value Reference Range Date Time    Hemoglobin  14.5 g/dL 11.1 - 15.9 02/10/25 0952    Hematocrit  44.8 % 34.0 - 46.6 02/10/25 0952    Platelets  406 x10E3/uL 150 - 450 02/10/25 0952    Rubella IgG  1.05 index Immune >0.99 02/10/25 0952    Hepatitis B SAg  Negative  Negative 02/10/25 0952    Hepatitis C Ab  Non Reactive  Non Reactive 02/10/25 0952    RPR  Non Reactive  Non Reactive 02/10/25 0952    T. Pallidum Ab   Non-Reactive  Non-Reactive 25 1033    ABO  O   25 1249    Rh  Negative   25 1249    Antibody Screen  Negative  Negative 02/10/25 0952    HIV  Non Reactive  Non Reactive 02/10/25 0952    Urine Culture  Final report   02/10/25 0952    Gonorrhea  Negative  Negative 02/10/25 0952    Chlamydia  Negative  Negative 02/10/25 0952    TSH        HgB A1c         Varicella IgG        Hemoglobinopathy Fractionation         Hemoglobinopathy (genetic testing)        Cystic fibrosis         Spinal muscular atrophy        Fragile X                  Fetal testing        Test Value Reference Range Date Time    NIPT        MSAFP        AFP-4                  2nd and 3rd Trimester       Test Value Reference Range Date Time    Hemoglobin (repeated)  12.4 g/dL 12.0 - 15.9 05/21/25 1033    Hematocrit (repeated)  39.2 % 34.0 - 46.6 05/21/25 1033    Platelets   369 10*3/mm3 140 - 450 05/21/25 1033       406 x10E3/uL 150 - 450 02/10/25 0952    1 hour GTT         Antibody Screen (repeated)        3rd TM syphilis scrn (repeated)  RPR         3rd TM syphilis scrn (repeated) TP-Ab        3rd TM syphilis screen TB-Ab (FTA)        Syphilis cascade test TP-Ab (EIA)        Syphilis cascade TPPA        GTT Fasting        GTT 1 Hr        GTT 2 Hr        GTT 3 Hr        Group B Strep                  Other testing        Test Value Reference Range Date Time    [place] IgG         CMV IgG                   Drug Screening       Test Value Reference Range Date Time    Amphetamine Screen  Negative ng/mL Bfrone=4152 02/10/25 0952    Barbiturate Screen  Negative ng/mL Urvcao=204 02/10/25 0952    Benzodiazepine Screen  Negative ng/mL Gzrkyf=371 02/10/25 0952    Methadone Screen  Negative ng/mL Ipmcfd=132 02/10/25 0952    Phencyclidine Screen  Negative ng/mL Cutoff=25 02/10/25 0952    Opiates Screen  Negative ng/mL Mjgera=473 02/10/25 0952    THC Screen  Negative ng/mL Cutoff=20 02/10/25 0952    Cocaine Screen  Negative ng/mL Aqurai=673 02/10/25 0952    Propoxyphene Screen  Negative ng/mL Bjbrcf=814 02/10/25 0952    Buprenorphine Screen        Methamphetamine Screen        Oxycodone Screen        Tricyclic Antidepressants Screen                  Legend    ^: Historical                          External Prenatal Results       Pregnancy Outside Results - Transcribed From Office Records - See Scanned Records For Details       Test Value Date Time    ABO  O  05/21/25 1245     Rh  Negative  25 1249    Antibody Screen  Negative  25 1033       Negative  02/10/25 0952    Varicella IgG       Rubella  1.05 index 02/10/25 0952    Hgb  12.4 g/dL 25 1033       14.5 g/dL 02/10/25 09    Hct  39.2 % 25 1033       44.8 % 02/10/25 09    HgB A1c        1h GTT       3h GTT Fasting       3h GTT 1 hour       3h GTT 2 hour       3h GTT 3 hour        Gonorrhea (discrete)  Negative  02/10/25 09    Chlamydia (discrete)  Negative  02/10/25 09    RPR  Non Reactive  02/10/25 0952    Syphils cascade: TP-Ab (FTA)  Non-Reactive  25 1033    TP-Ab  Non-Reactive  25 1033    TP-Ab (EIA)       TPPA       HBsAg  Negative  02/10/25 09    Herpes Simplex Virus PCR       Herpes Simplex VIrus Culture       HIV  Non Reactive  02/10/25 09    Hep C RNA Quant PCR       Hep C Antibody  Non Reactive  02/10/25 0952    AFP       NIPT       Cystic Fibrosis ([facility])       Cystic Fibroisis        Spinal Muscular atrophy       Fragile X       Group B Strep       GBS Susceptibility to Clindamycin       GBS Susceptibility to Erythromycin       Fetal Fibronectin       Genetic Testing, Maternal Blood                 Drug Screening       Test Value Date Time    Urine Drug Screen       Amphetamine Screen  Negative ng/mL 02/10/25 0952    Barbiturate Screen  Negative ng/mL 02/10/25 0952    Benzodiazepine Screen  Negative ng/mL 02/10/25 0952    Methadone Screen  Negative ng/mL 02/10/25 0952    Phencyclidine Screen  Negative ng/mL 02/10/25 0952    Opiates Screen       THC Screen       Cocaine Screen       Propoxyphene Screen  Negative ng/mL 02/10/25 0952    Buprenorphine Screen       Methamphetamine Screen       Oxycodone Screen       Tricyclic Antidepressants Screen                 Legend    ^: Historical                             Past OB History:     OB History    Para Term  AB Living   4 1 0 1 2 1   SAB IAB Ectopic Molar Multiple Live Births   0 2 0 0 0 1      # Outcome Date GA  Lbr Aime/2nd Weight Sex Type Anes PTL Lv   4 Current            3  16 28w0d  1077 g (2 lb 6 oz) M Vaginal unsp  Y MERI      Complications: Placental abruption   2 IAB            1 IAB                Past Medical History: Past Medical History:   Diagnosis Date    Abnormal Pap smear of cervix          Frequent UTI     Miscarriage     2 or less    Ovarian cyst     Small    Pregnancy     other than first      Past Surgical History Past Surgical History:   Procedure Laterality Date    DILATATION AND CURETTAGE      DILATION AND CURETTAGE, DIAGNOSTIC / THERAPEUTIC        Family History: Family History   Problem Relation Age of Onset    Colon cancer Father     Hypertension Mother     Depression Mother     Hypertension Paternal Grandmother     Lung cancer Maternal Grandmother     Colon cancer Maternal Grandfather     Breast cancer Neg Hx     Ovarian cancer Neg Hx     Uterine cancer Neg Hx       Social History:  reports that she has never smoked. She has never been exposed to tobacco smoke. She has never used smokeless tobacco.   reports that she does not currently use alcohol after a past usage of about 5.0 standard drinks of alcohol per week.   reports no history of drug use.        Review of Systems:    All other systems reviewed and negative    Objective     Vital Signs Range for the last 24 hours  Temperature: Temp:  [98.3 °F (36.8 °C)-98.8 °F (37.1 °C)] 98.3 °F (36.8 °C)   Temp Source:     BP: BP: (114-128)/(63-76) 118/68   Pulse: Heart Rate:  [] 102   Respirations: Resp:  [16-18] 16   SPO2: SpO2:  [96 %-100 %] 98 %   O2 Amount (l/min):     O2 Devices     Weight: Weight:  [80.3 kg (177 lb)-81.2 kg (179 lb)] 81.2 kg (179 lb)     Physical Examination: General appearance - alert, well appearing, and in no distress  Neck - supple, no significant adenopathy  Abdomen - soft, nontender, nondistended, no masses or organomegaly  Musculoskeletal - no joint tenderness, deformity or  swelling  Extremities - peripheral pulses normal, no pedal edema, no clubbing or cyanosis  Skin - normal coloration and turgor, no rashes, no suspicious skin lesions noted    Presentation: Vertex by ultrasound                     Fetal Heart Rate Assessment   Method:     Beats/min:     Baseline:     Varibility:     Accels:     Decels:     Tracing Category:       Uterine Assessment   Method:     Frequency (min):     Ctx Count in 10 min:     Duration:     Intensity:     Intensity by IUPC:     Resting Tone:     Resting Tone by IUPC:     [place] Units:       Laboratory Results:   WBC   Date Value Ref Range Status   2025 12.49 (H) 3.40 - 10.80 10*3/mm3 Final     RBC   Date Value Ref Range Status   2025 4.70 3.77 - 5.28 10*6/mm3 Final     Hemoglobin   Date Value Ref Range Status   2025 12.4 12.0 - 15.9 g/dL Final     Hematocrit   Date Value Ref Range Status   2025 39.2 34.0 - 46.6 % Final     MCV   Date Value Ref Range Status   2025 83.4 79.0 - 97.0 fL Final     MCH   Date Value Ref Range Status   2025 26.4 (L) 26.6 - 33.0 pg Final     MCHC   Date Value Ref Range Status   2025 31.6 31.5 - 35.7 g/dL Final     RDW   Date Value Ref Range Status   2025 13.9 12.3 - 15.4 % Final     RDW-SD   Date Value Ref Range Status   2025 42.0 37.0 - 54.0 fl Final     MPV   Date Value Ref Range Status   2025 9.6 6.0 - 12.0 fL Final     Platelets   Date Value Ref Range Status   2025 369 140 - 450 10*3/mm3 Final         Assessment & Plan       Cervical funneling affecting pregnancy    History of  delivery    Rh negative status during pregnancy in second trimester    Cervical shortening in second trimester      Assessment & Plan    Assessment:  1.  Intrauterine pregnancy at 24w1d weeks gestation with reassuring fetal status.    2.  Cervical funneling and shortening, CL 5mm  3.  H/O PTD - h/o abruption  4.  Rh neg    Plan:  1. Admission for steroids, tocolytics, MFM  consult  2. Plan of care has been reviewed with patient and   3.  Risks, benefits of treatment plan have been discussed.  4.  All questions have been answered.        Kath Irizarry MD  5/21/2025

## 2025-05-21 NOTE — PROGRESS NOTES
OB FOLLOW UP  CC- Here for care of pregnancy        Mely Esquivel is a 30 y.o.  24w1d patient being seen today for her obstetrical follow up visit. Patient reports no complaints. Pt is upset after U/S due to shortened cx and hx of PTD @ 28wks.     Her prenatal care is complicated by (and status) : see below.  Patient Active Problem List   Diagnosis    Female hirsutism    Anxiety    Bulimia    History of  delivery    Second trimester pregnancy    Vaginal discharge    Multigravida in second trimester       Flu Status: Declines  Ultrasound Today: Yes  Reviewed 1 hr glucose testing, TDAP, and Rhogam next visit.    ROS -   Patient Denies: leaking of fluid, vaginal bleeding, dysuria, excessive vomiting, and more than 6 contractions per hour  Fetal Movement : normal  Other than what is documented in the HPI, all other systems reviewed and are negative.       The additional following portions of the patient's history were reviewed and updated as appropriate: allergies.      I have reviewed and agree with the HPI, ROS, and historical information as entered above. Kath Irizarry MD      /74   Wt 80.3 kg (177 lb)   LMP 2024   BMI 33.44 kg/m²       EXAM:     Prenatal Vitals  BP: 122/74  Weight: 80.3 kg (177 lb)                   Urine Glucose Read-only: Negative  Urine Protein Read-only: Negative       Assessment and Plan    Problem List Items Addressed This Visit       Second trimester pregnancy - Primary    Relevant Orders    POC Urinalysis Dipstick (Completed)       Pregnancy at 24w1d  Fetal status reassuring.  Cervical shortening and funnel noted.  At 5mm residual cervix, recommend [facility] admission, MFM consultation.        Kath Irizarry MD  2025

## 2025-05-22 LAB
CREAT UR-MCNC: 17.1 MG/DL
EXPIRATION DATE: NORMAL
Lab: NORMAL
PROT ?TM UR-MCNC: <4 MG/DL
PROT UR STRIP-MCNC: NEGATIVE MG/DL
PROT/CREAT UR: NORMAL MG/G{CREAT}

## 2025-05-22 PROCEDURE — 25010000002 CEFAZOLIN PER 500 MG: Performed by: OBSTETRICS & GYNECOLOGY

## 2025-05-22 PROCEDURE — 59025 FETAL NON-STRESS TEST: CPT

## 2025-05-22 PROCEDURE — 25810000003 LACTATED RINGERS PER 1000 ML: Performed by: OBSTETRICS & GYNECOLOGY

## 2025-05-22 PROCEDURE — 25010000002 BETAMETHASONE ACET & SOD PHOS PER 4 MG: Performed by: OBSTETRICS & GYNECOLOGY

## 2025-05-22 PROCEDURE — 81002 URINALYSIS NONAUTO W/O SCOPE: CPT | Performed by: OBSTETRICS & GYNECOLOGY

## 2025-05-22 PROCEDURE — 25010000002 MAGNESIUM SULFATE 20 GM/500ML SOLUTION: Performed by: OBSTETRICS & GYNECOLOGY

## 2025-05-22 RX ADMIN — SODIUM CHLORIDE, POTASSIUM CHLORIDE, SODIUM LACTATE AND CALCIUM CHLORIDE 75 ML/HR: 600; 310; 30; 20 INJECTION, SOLUTION INTRAVENOUS at 12:26

## 2025-05-22 RX ADMIN — ACETAMINOPHEN 650 MG: 325 TABLET ORAL at 18:43

## 2025-05-22 RX ADMIN — BETAMETHASONE SODIUM PHOSPHATE AND BETAMETHASONE ACETATE 12 MG: 3; 3 INJECTION, SUSPENSION INTRA-ARTICULAR; INTRALESIONAL; INTRAMUSCULAR at 11:41

## 2025-05-22 RX ADMIN — MAGNESIUM SULFATE IN WATER 2 G/HR: 20 INJECTION, SOLUTION INTRAVENOUS at 05:38

## 2025-05-22 RX ADMIN — INDOMETHACIN 25 MG: 25 CAPSULE ORAL at 12:26

## 2025-05-22 RX ADMIN — INDOMETHACIN 25 MG: 25 CAPSULE ORAL at 08:59

## 2025-05-22 RX ADMIN — CEFAZOLIN 1000 MG: 1 INJECTION, POWDER, FOR SOLUTION INTRAMUSCULAR; INTRAVENOUS at 11:40

## 2025-05-22 RX ADMIN — CEFAZOLIN 1000 MG: 1 INJECTION, POWDER, FOR SOLUTION INTRAMUSCULAR; INTRAVENOUS at 18:42

## 2025-05-22 RX ADMIN — CEFAZOLIN 1000 MG: 1 INJECTION, POWDER, FOR SOLUTION INTRAMUSCULAR; INTRAVENOUS at 03:31

## 2025-05-22 RX ADMIN — PROGESTERONE 200 MG: 100 CAPSULE ORAL at 21:31

## 2025-05-22 RX ADMIN — INDOMETHACIN 25 MG: 25 CAPSULE ORAL at 16:50

## 2025-05-22 RX ADMIN — INDOMETHACIN 25 MG: 25 CAPSULE ORAL at 03:31

## 2025-05-22 NOTE — PROGRESS NOTES
Daily Progress Note    Patient name: Mely Esquivel  YOB: 1995   MRN: [id number]  Referring Provider: Kath Irizarry  Admission Date: 2025  Date of Service: 2025    Mely Esquivel is a 30 y.o.    at 24w2d  admitted on 2025 for Cervical funneling affecting pregnancy    Hospital day 1    Diagnoses:     Cervical funneling affecting pregnancy    History of  delivery    Rh negative status during pregnancy in second trimester    Cervical shortening in second trimester       Prenatal Labs  Lab Results   Component Value Date    HGB 12.4 2025    HEPBSAG Negative 02/10/2025    LABRPR Non-reactive 2016    ABORH O Rh Negative 2016    ABO O 2025    RH Negative 2025    ABSCRN Negative 2025    ABID Anti-D due to RHIG 2016    [id number] Non Reactive 02/10/2025    HEPCVIRUSABY Non Reactive 02/10/2025    URINECX Final report 02/10/2025       Subjective:      Mely has no complaints today except feeling bad on mag.  Reports fetal movement is normal  No contractions  Denies leakage of amniotic fluid.  Denies vaginal bleeding    Review of Systems - all other systems reviewed and negative    Objective:     Vital signs:  Vital Signs Range for the last 24 hours  Temperature: Temp:  [97.9 °F (36.6 °C)-98.9 °F (37.2 °C)] 98.3 °F (36.8 °C)   Temp Source: Temp src: Oral   BP: BP: ()/(53-77) 109/71   Pulse: Heart Rate:  [] 90   Respirations: Resp:  [14-18] 16   Weight: 81.2 kg (179 lb)     General: Alert & oriented x4, in no apparent distress  HEENT: Grossly normal  Resp: Unlabored breathing  Abdomen: Soft, nontender  Uterus: Gravid, nontender  Extremities: Nontender, no pain, no edema   Neurologic:  Alert & oriented x 4,  no focal deficits noted  Psychiatric:  Speech and behavior appropriate     Non-Stress Test:  Appropriate for gestational age on magnesium  Not rajan  Time >20min  Indication: Cervical  funneling/shortening    Cephalic on ultrasound yesterday        Medications:  ceFAZolin, 1,000 mg, Intravenous, Q8H  indomethacin, 25 mg, Oral, Q4H  Progesterone, 200 mg, Oral, Nightly  sodium chloride, 10 mL, Intravenous, Q12H         acetaminophen    bisacodyl    calcium carbonate    calcium gluconate    docusate sodium    lidocaine PF 1%    ondansetron ODT **OR** ondansetron    sodium chloride    sodium chloride    Labs:  Lab Results   Component Value Date    WBC 12.49 (H) 2025    HGB 12.4 2025    HCT 39.2 2025    MCV 83.4 2025     2025    CREATININE 0.53 (L) 2025    URICACID 3.3 2025    AST 20 2025    ALT 21 2025     2025     Results from last 7 days   Lab Units 25  1249 25  1033   ABO TYPING  O O   RH TYPING  Negative Negative   ANTIBODY SCREEN   --  Negative       Assessment/Plan:      25   Mely is a 30 y.o.    at 24w2d with Cervical funneling affecting pregnancy     Medical Problems       Hospital Problem List       * (Principal) Cervical funneling affecting pregnancy    History of  delivery    Overview Signed 2/10/2025  9:41 AM by Kath Irizarry MD   Plan cervical ultrasound 16wk         Rh negative status during pregnancy in second trimester    Cervical shortening in second trimester        Will complete indocin this evening.  Mag turned down to 1.  Will continue at least til jhon AM.  S/p ANCS #2 @ 11:39.  SCDs prophylactically  Plan is for repeat ultrasound jhon AM by MFM.  Appreciate Dr. Fonseca's guidance.  Patient understands that if she were to show signs of labor, she would need to be transferred to  for higher level NICU.     Kath Irizarry MD  2025 12:30 EDT

## 2025-05-23 ENCOUNTER — APPOINTMENT (OUTPATIENT)
Dept: WOMENS IMAGING | Facility: HOSPITAL | Age: 30
End: 2025-05-23
Payer: COMMERCIAL

## 2025-05-23 DIAGNOSIS — O26.872 CERVICAL SHORTENING IN SECOND TRIMESTER: Primary | ICD-10-CM

## 2025-05-23 PROCEDURE — 25010000002 MAGNESIUM SULFATE 20 GM/500ML SOLUTION: Performed by: OBSTETRICS & GYNECOLOGY

## 2025-05-23 PROCEDURE — 99231 SBSQ HOSP IP/OBS SF/LOW 25: CPT | Performed by: OBSTETRICS & GYNECOLOGY

## 2025-05-23 PROCEDURE — 76811 OB US DETAILED SNGL FETUS: CPT | Performed by: OBSTETRICS & GYNECOLOGY

## 2025-05-23 PROCEDURE — 76811 OB US DETAILED SNGL FETUS: CPT

## 2025-05-23 PROCEDURE — 76817 TRANSVAGINAL US OBSTETRIC: CPT

## 2025-05-23 PROCEDURE — 59025 FETAL NON-STRESS TEST: CPT

## 2025-05-23 PROCEDURE — 25810000003 LACTATED RINGERS PER 1000 ML: Performed by: OBSTETRICS & GYNECOLOGY

## 2025-05-23 PROCEDURE — 76817 TRANSVAGINAL US OBSTETRIC: CPT | Performed by: OBSTETRICS & GYNECOLOGY

## 2025-05-23 PROCEDURE — 25010000002 CEFAZOLIN PER 500 MG: Performed by: OBSTETRICS & GYNECOLOGY

## 2025-05-23 RX ORDER — POLYETHYLENE GLYCOL 3350 17 G/17G
17 POWDER, FOR SOLUTION ORAL DAILY
Status: DISCONTINUED | OUTPATIENT
Start: 2025-05-23 | End: 2025-05-24 | Stop reason: HOSPADM

## 2025-05-23 RX ORDER — DOCUSATE SODIUM 100 MG/1
100 CAPSULE, LIQUID FILLED ORAL 2 TIMES DAILY
Status: DISCONTINUED | OUTPATIENT
Start: 2025-05-23 | End: 2025-05-24 | Stop reason: HOSPADM

## 2025-05-23 RX ORDER — MAGNESIUM SULFATE HEPTAHYDRATE 40 MG/ML
1 INJECTION, SOLUTION INTRAVENOUS CONTINUOUS
Status: ACTIVE | OUTPATIENT
Start: 2025-05-23 | End: 2025-05-23

## 2025-05-23 RX ADMIN — MAGNESIUM SULFATE IN WATER 1 G/HR: 20 INJECTION, SOLUTION INTRAVENOUS at 00:32

## 2025-05-23 RX ADMIN — POLYETHYLENE GLYCOL 3350 17 G: 17 POWDER, FOR SOLUTION ORAL at 09:59

## 2025-05-23 RX ADMIN — DOCUSATE SODIUM 100 MG: 100 CAPSULE, LIQUID FILLED ORAL at 09:59

## 2025-05-23 RX ADMIN — PROGESTERONE 200 MG: 100 CAPSULE ORAL at 20:56

## 2025-05-23 RX ADMIN — CEFAZOLIN 1000 MG: 1 INJECTION, POWDER, FOR SOLUTION INTRAMUSCULAR; INTRAVENOUS at 03:53

## 2025-05-23 RX ADMIN — CEFAZOLIN 1000 MG: 1 INJECTION, POWDER, FOR SOLUTION INTRAMUSCULAR; INTRAVENOUS at 10:00

## 2025-05-23 RX ADMIN — SODIUM CHLORIDE, POTASSIUM CHLORIDE, SODIUM LACTATE AND CALCIUM CHLORIDE 75 ML/HR: 600; 310; 30; 20 INJECTION, SOLUTION INTRAVENOUS at 02:01

## 2025-05-23 RX ADMIN — DOCUSATE SODIUM 100 MG: 100 CAPSULE, LIQUID FILLED ORAL at 20:56

## 2025-05-23 RX ADMIN — Medication 10 ML: at 20:56

## 2025-05-23 NOTE — PROGRESS NOTES
Daily Progress Note    Patient name: Mely Esquivel  YOB: 1995   MRN: [id number]  Referring Provider: Kath Irizarry  Admission Date: 2025  Date of Service: 2025    Mely Esquivel is a 30 y.o.    at 24w3d  admitted on 2025 for Cervical funneling affecting pregnancy    Hospital day 2    Diagnoses:     Cervical funneling affecting pregnancy    History of  delivery    Rh negative status during pregnancy in second trimester    Cervical shortening in second trimester       Prenatal Labs  Lab Results   Component Value Date    HGB 12.4 2025    HEPBSAG Negative 02/10/2025    LABRPR Non-reactive 2016    ABORH O Rh Negative 2016    ABO O 2025    RH Negative 2025    ABSCRN Negative 2025    ABID Anti-D due to RHIG 2016    [id number] Non Reactive 02/10/2025    HEPCVIRUSABY Non Reactive 02/10/2025    URINECX Final report 02/10/2025       Subjective:      Mely has no complaints today.  Reports fetal movement is normal  No contractions  Denies leakage of amniotic fluid.  Denies vaginal bleeding    Review of Systems - all other systems reviewed and negative.    Objective:     Vital signs:  Vital Signs Range for the last 24 hours  Temperature: Temp:  [98.1 °F (36.7 °C)-99.2 °F (37.3 °C)] 99.2 °F (37.3 °C)   Temp Source: Temp src: Oral   BP: BP: ()/(53-72) 114/59   Pulse: Heart Rate:  [75-99] 83   Respirations: Resp:  [16-18] 16   Weight: 81.2 kg (179 lb)     General: Alert & oriented x4, in no apparent distress  HEENT: Grossly normal  Resp: Unlabored breathing  Abdomen: Soft, nontender  Uterus: Gravid, nontender  Extremities: Nontender, no pain, no edema   Neurologic:  Alert & oriented x 4,  no focal deficits noted  Psychiatric:  Speech and behavior appropriate     Non-Stress Test:  Fetal Heart Rate Assessment   Method: Fetal HR Assessment Method: external   Beats/min: Fetal HR (beats/min): 140     Time:  >20min    Appropriate for gestational age      Most recent ultrasound:  CL/Funnel unchanged this AM  Breech    Medications:  docusate sodium, 100 mg, Oral, BID  polyethylene glycol, 17 g, Oral, Daily  Progesterone, 200 mg, Oral, Nightly  sodium chloride, 10 mL, Intravenous, Q12H         acetaminophen    bisacodyl    calcium carbonate    docusate sodium    lidocaine PF 1%    ondansetron ODT **OR** ondansetron    sodium chloride    sodium chloride    Labs:  Lab Results   Component Value Date    WBC 12.49 (H) 2025    HGB 12.4 2025    HCT 39.2 2025    MCV 83.4 2025     2025    CREATININE 0.53 (L) 2025    URICACID 3.3 2025    AST 20 2025    ALT 21 2025     2025     Results from last 7 days   Lab Units 25  1249 25  1033   ABO TYPING  O O   RH TYPING  Negative Negative   ANTIBODY SCREEN   --  Negative       Assessment/Plan:      25   Mely is a 30 y.o.    at 24w3d with Cervical funneling affecting pregnancy     Medical Problems       Hospital Problem List       * (Principal) Cervical funneling affecting pregnancy    History of  delivery    Overview Signed 2/10/2025  9:41 AM by Kath Irizarry MD   Plan cervical ultrasound 16wk         Rh negative status during pregnancy in second trimester    Cervical shortening in second trimester        D/c Mag at noon  If stable over night, may d/c tomorrow  F/u weekly with us.  Has appt for repeat CL at [facility] 2wk    Kath Irizarry MD  2025

## 2025-05-24 VITALS
SYSTOLIC BLOOD PRESSURE: 110 MMHG | WEIGHT: 179 LBS | RESPIRATION RATE: 18 BRPM | OXYGEN SATURATION: 98 % | BODY MASS INDEX: 32.94 KG/M2 | TEMPERATURE: 98.6 F | HEIGHT: 62 IN | HEART RATE: 84 BPM | DIASTOLIC BLOOD PRESSURE: 64 MMHG

## 2025-05-24 PROCEDURE — 99238 HOSP IP/OBS DSCHRG MGMT 30/<: CPT | Performed by: OBSTETRICS & GYNECOLOGY

## 2025-05-24 NOTE — DISCHARGE SUMMARY
Date of Discharge:  2025    Discharge Diagnosis: Cervical incompetence, cervical funneling/shortening    Presenting Problem/History of Present Illness  Cervical funneling affecting pregnancy [O34.30]  H/o  delivery     \  Hospital Course  Patient is a 30 y.o. female presented at 24w1d after routine ultrasound showed cervical funneling and shortening.  She was given an indocin course and magnesium.  She had  corticosteroids.  After magnesium turned off when in steroid window, she remained stable another 24hr.  Repeat cervical length unchanged.  She was felt safe for discharge on limited activities, Pelvic rest.           Consults:   Consults       Date and Time Order Name Status Description    2025  3:30 PM Inpatient Maternal & Fetal Medicine Consult      2025 12:06 PM Inpatient Maternal & Fetal Medicine Consult Completed             Condition on Discharge:  stable    Vital Signs  Temp:  [98.1 °F (36.7 °C)-99.2 °F (37.3 °C)] 98.1 °F (36.7 °C)  Heart Rate:  [71-99] 80  Resp:  [16-18] 18  BP: ()/(55-72) 98/55      Discharge Disposition  Home or Self Care    Discharge Medications     Discharge Medications        Continue These Medications        Instructions Start Date   aspirin 81 MG EC tablet   81 mg, Daily      PRENATAL VIT-FE-FA-CA-DSS-DHA PO   Take  by mouth.      Progesterone 200 MG capsule  Commonly known as: PROMETRIUM   200 mg, Vaginal, Daily               Discharge Diet: as blaise    Activity at Discharge: pelvic rest, modified bedrest.    Follow-up Appointments  Future Appointments   Date Time Provider Department Center   2025 12:30 PM [facility]  [facility]   2025 12:30 PM  [facility] NEW PATIENT [facility] None     f/u 1wk with Dr. Edie Irizarry MD  25  02:05 EDT

## 2025-05-26 ENCOUNTER — HOSPITAL ENCOUNTER (INPATIENT)
Facility: HOSPITAL | Age: 30
LOS: 1 days | Discharge: OTHER INSTITUTION | End: 2025-05-26
Attending: OBSTETRICS & GYNECOLOGY | Admitting: OBSTETRICS & GYNECOLOGY
Payer: COMMERCIAL

## 2025-05-26 VITALS
OXYGEN SATURATION: 96 % | TEMPERATURE: 98.2 F | WEIGHT: 179 LBS | DIASTOLIC BLOOD PRESSURE: 79 MMHG | BODY MASS INDEX: 33.79 KG/M2 | HEIGHT: 61 IN | HEART RATE: 123 BPM | SYSTOLIC BLOOD PRESSURE: 131 MMHG | RESPIRATION RATE: 18 BRPM

## 2025-05-26 PROBLEM — O60.00 PRETERM LABOR: Status: ACTIVE | Noted: 2025-05-26

## 2025-05-26 PROCEDURE — 25810000003 LACTATED RINGERS PER 1000 ML: Performed by: OBSTETRICS & GYNECOLOGY

## 2025-05-26 RX ORDER — SODIUM CHLORIDE 0.9 % (FLUSH) 0.9 %
10 SYRINGE (ML) INJECTION EVERY 12 HOURS SCHEDULED
Status: DISCONTINUED | OUTPATIENT
Start: 2025-05-26 | End: 2025-05-26 | Stop reason: HOSPADM

## 2025-05-26 RX ORDER — MAGNESIUM SULFATE HEPTAHYDRATE 40 MG/ML
INJECTION, SOLUTION INTRAVENOUS
Status: DISCONTINUED
Start: 2025-05-26 | End: 2025-05-26 | Stop reason: HOSPADM

## 2025-05-26 RX ORDER — LIDOCAINE HYDROCHLORIDE 10 MG/ML
0.5 INJECTION, SOLUTION EPIDURAL; INFILTRATION; INTRACAUDAL; PERINEURAL ONCE AS NEEDED
Status: DISCONTINUED | OUTPATIENT
Start: 2025-05-26 | End: 2025-05-26 | Stop reason: HOSPADM

## 2025-05-26 RX ORDER — SODIUM CHLORIDE 9 MG/ML
40 INJECTION, SOLUTION INTRAVENOUS AS NEEDED
Status: DISCONTINUED | OUTPATIENT
Start: 2025-05-26 | End: 2025-05-26 | Stop reason: HOSPADM

## 2025-05-26 RX ORDER — MAGNESIUM SULFATE HEPTAHYDRATE 40 MG/ML
2 INJECTION, SOLUTION INTRAVENOUS CONTINUOUS
Status: DISCONTINUED | OUTPATIENT
Start: 2025-05-26 | End: 2025-05-26 | Stop reason: HOSPADM

## 2025-05-26 RX ORDER — SODIUM CHLORIDE 0.9 % (FLUSH) 0.9 %
10 SYRINGE (ML) INJECTION AS NEEDED
Status: DISCONTINUED | OUTPATIENT
Start: 2025-05-26 | End: 2025-05-26 | Stop reason: HOSPADM

## 2025-05-26 RX ORDER — SODIUM CHLORIDE, SODIUM LACTATE, POTASSIUM CHLORIDE, CALCIUM CHLORIDE 600; 310; 30; 20 MG/100ML; MG/100ML; MG/100ML; MG/100ML
75 INJECTION, SOLUTION INTRAVENOUS CONTINUOUS
Status: ACTIVE | OUTPATIENT
Start: 2025-05-26 | End: 2025-05-26

## 2025-05-26 RX ORDER — NIFEDIPINE 10 MG/1
10 CAPSULE ORAL ONCE
Status: COMPLETED | OUTPATIENT
Start: 2025-05-26 | End: 2025-05-26

## 2025-05-26 RX ORDER — CALCIUM GLUCONATE 98 MG/ML
1 INJECTION, SOLUTION INTRAVENOUS AS NEEDED
Status: DISCONTINUED | OUTPATIENT
Start: 2025-05-26 | End: 2025-05-26 | Stop reason: HOSPADM

## 2025-05-26 RX ORDER — NIFEDIPINE 10 MG/1
10 CAPSULE ORAL ONCE
Status: DISCONTINUED | OUTPATIENT
Start: 2025-05-26 | End: 2025-05-26 | Stop reason: HOSPADM

## 2025-05-26 RX ADMIN — SODIUM CHLORIDE, POTASSIUM CHLORIDE, SODIUM LACTATE AND CALCIUM CHLORIDE 75 ML/HR: 600; 310; 30; 20 INJECTION, SOLUTION INTRAVENOUS at 18:30

## 2025-05-26 RX ADMIN — NIFEDIPINE 10 MG: 10 CAPSULE ORAL at 18:24

## 2025-05-26 NOTE — H&P
"[facility]  Obstetric History and Physical    Chief Complaint   Patient presents with    Vaginal Discharge       HPI:      Patient is a 30 y.o. female  currently at 24w6d, who presents with a complaint of brown discharge.  She was discharge from [facility] with a shortened cervix with funneling.   She did receive Magnesium and BMZ x2 completing the course on .  She was discharged in stable condition.    She denies having any abdominal pain, cramping or contractions.    Denies vaginal leaking or bleeding.   Denies fever and chills.         The following portions of the patients history were reviewed and updated as appropriate: current medications, allergies, past medical history, past surgical history, past family history, past social history and problem list .       Prenatal Information:   Maternal Prenatal Labs  Blood Type No results found for: \"ABO\"   Rh Status No results found for: \"RH\"   Antibody Screen No results found for: \"ABSCRN\"   Gonnorhea No results found for: \"GCCX\"   Chlamydia No results found for: \"CLAMYDCU\"   RPR No results found for: \"RPR\"   Syphilis Antibody No results found for: \"SYPHILIS\"   Rubella No results found for: \"RUBELLAIGGIN\"   Hepatitis B Surface Antigen No results found for: \"HEPBSAG\"   HIV-1 Antibody No results found for: \"LABHIV1\"   Hepatitis C Antibody No results found for: \"HEPCAB\"   Rapid Urin Drug Screen No results found for: \"AMPMETHU\", \"BARBITSCNUR\", \"LABBENZSCN\", \"LABMETHSCN\", \"LABOPIASCN\", \"THCURSCR\", \"COCAINEUR\", \"AMPHETSCREEN\", \"PROPOXSCN\", \"BUPRENORSCNU\", \"METAMPSCNUR\", \"OXYCODONESCN\", \"TRICYCLICSCN\"   Group B Strep Culture No results found for: \"GBSANTIGEN\"           External Prenatal Results       Pregnancy Outside Results - Transcribed From Office Records - See Scanned Records For Details       Test Value Date Time    ABO  O  25 1249    Rh  Negative  25 1249    Antibody Screen  Negative  25 1033       Negative  02/10/25 0952    Varicella IgG       " Rubella  1.05 index 02/10/25 0952    Hgb  12.4 g/dL 25 1033       14.5 g/dL 02/10/25 0952    Hct  39.2 % 25 1033       44.8 % 02/10/25 0952    HgB A1c        1h GTT       3h GTT Fasting       3h GTT 1 hour       3h GTT 2 hour       3h GTT 3 hour        Gonorrhea (discrete)  Negative  02/10/25 0952    Chlamydia (discrete)  Negative  02/10/25 0952    RPR  Non Reactive  02/10/25 0952    Syphils cascade: TP-Ab (FTA)  Non-Reactive  25 1033    TP-Ab  Non-Reactive  25 1033    TP-Ab (EIA)       TPPA       HBsAg  Negative  02/10/25 09    Herpes Simplex Virus PCR       Herpes Simplex VIrus Culture       HIV  Non Reactive  02/10/25 0952    Hep C RNA Quant PCR       Hep C Antibody  Non Reactive  02/10/25 0952    AFP       NIPT       Cystic Fibrosis (Raz)       Cystic Fibroisis        Spinal Muscular atrophy       Fragile X       Group B Strep       GBS Susceptibility to Clindamycin       GBS Susceptibility to Erythromycin       Fetal Fibronectin       Genetic Testing, Maternal Blood                 Drug Screening       Test Value Date Time    Urine Drug Screen       Amphetamine Screen  Negative ng/mL 02/10/25 0952    Barbiturate Screen  Negative ng/mL 02/10/25 0952    Benzodiazepine Screen  Negative ng/mL 02/10/25 0952    Methadone Screen  Negative ng/mL 02/10/25 0952    Phencyclidine Screen  Negative ng/mL 02/10/25 0952    Opiates Screen       THC Screen       Cocaine Screen       Propoxyphene Screen  Negative ng/mL 02/10/25 0952    Buprenorphine Screen       Methamphetamine Screen       Oxycodone Screen       Tricyclic Antidepressants Screen                 Legend    ^: Historical                              Past OB History:     OB History    Para Term  AB Living   4 1 0 1 2 1   SAB IAB Ectopic Molar Multiple Live Births   0 2 0 0 0 1      # Outcome Date GA Lbr Aime/2nd Weight Sex Type Anes PTL Lv   4 Current            3  16 28w0d  1077 g (2 lb 6 oz) M Vaginal unsp  Y  MERI      Complications: Placental abruption   2 IAB            1 IAB                Past Medical History: Past Medical History:   Diagnosis Date    Abnormal Pap smear of cervix          Frequent UTI     Miscarriage     2 or less    Ovarian cyst     Small    Pregnancy     other than first      Past Surgical History Past Surgical History:   Procedure Laterality Date    DILATATION AND CURETTAGE      DILATION AND CURETTAGE, DIAGNOSTIC / THERAPEUTIC        Family History: Family History   Problem Relation Age of Onset    Colon cancer Father     Hypertension Mother     Depression Mother     Hypertension Paternal Grandmother     Lung cancer Maternal Grandmother     Colon cancer Maternal Grandfather     Breast cancer Neg Hx     Ovarian cancer Neg Hx     Uterine cancer Neg Hx       Social History:  reports that she has never smoked. She has never been exposed to tobacco smoke. She has never used smokeless tobacco.   reports that she does not currently use alcohol after a past usage of about 5.0 standard drinks of alcohol per week.   reports no history of drug use.        Review of Systems  Denies fever, HA, CP, Shortness of air, muscle weakness,                                             and rashes      Objective     Vital Signs Range for the last 24 hours  Temperature: Temp:  [98.3 °F (36.8 °C)] 98.3 °F (36.8 °C)   Temp Source: Temp src: Oral   BP: BP: (124)/(83) 124/83   Pulse:  101   Respirations: Resp:  [18] 18   SPO2:     O2 Amount (l/min):     O2 Devices     Weight: Weight:  [81.2 kg (179 lb)] 81.2 kg (179 lb)     Physical Examination: General appearance - alert, well appearing, and in no distress and oriented to person, place, and time  Chest -Breathing is unlaboured   Heart - Tachycardia rate   Abdomen - soft, nontender, nondistended, gravid uterus   Extremities - pedal edema  - none    Presentation: [place] breech    Cervix: Exam by: Method: sterile vaginal exam performed ([place])    Dilation:  3-4   Effacement:  90   Station:       Fetal Heart Rate Assessment   Method:     Beats/min:     Baseline:  130s    Varibility:     Accels:     Decels:     Tracing Category:       Uterine Assessment   Method: Method: palpation   Frequency (min):     Ctx Count in 10 min:     Duration:     Intensity: Contraction Intensity: no contractions   Intensity by IUPC:     Resting Tone: Uterine Resting Tone: soft by palpation   Resting Tone by IUPC:                   Assessment:  1. Intrauterine pregnancy at 24w6d gestation  2. Impending  delivery   3. Breech presentation    4.  S/P Magnesium and BMZ x2  completed course     Plan:  1. IV, Magnesium for neuro protection   2. Procardia 10 mg immediate release  3. NPO   4.  Trendelenburg position  5.   Transfer to     Michael Harper MD  2025  18:21 EDT

## 2025-05-27 ENCOUNTER — TELEPHONE (OUTPATIENT)
Dept: FAMILY MEDICINE CLINIC | Facility: CLINIC | Age: 30
End: 2025-05-27
Payer: COMMERCIAL

## 2025-05-27 NOTE — TELEPHONE ENCOUNTER
CALLED PT TO FOLLOW UP ON RECENT VISIT TO ED AND OFFER TO SCHEDULE HER WITH NEW PCP. PT DECLINED AT THIS TIME AS SHE IS SATISFIED WITH ONLY SEEING OB FOR NOW.

## 2025-08-22 DIAGNOSIS — Z91.89 BREASTFEEDING PROBLEM: Primary | ICD-10-CM
